# Patient Record
Sex: MALE | Race: WHITE | Employment: UNEMPLOYED | URBAN - METROPOLITAN AREA
[De-identification: names, ages, dates, MRNs, and addresses within clinical notes are randomized per-mention and may not be internally consistent; named-entity substitution may affect disease eponyms.]

---

## 2019-03-17 ENCOUNTER — HOSPITAL ENCOUNTER (OUTPATIENT)
Age: 84
Setting detail: OBSERVATION
LOS: 1 days | Discharge: HOME | End: 2019-03-19
Attending: EMERGENCY MEDICINE | Admitting: HOSPITALIST
Payer: MEDICARE

## 2019-03-17 ENCOUNTER — APPOINTMENT (OUTPATIENT)
Dept: CT IMAGING | Age: 84
End: 2019-03-17
Payer: MEDICARE

## 2019-03-17 DIAGNOSIS — R47.01 EXPRESSIVE APHASIA: Primary | ICD-10-CM

## 2019-03-17 DIAGNOSIS — I63.9 CEREBROVASCULAR ACCIDENT (CVA), UNSPECIFIED MECHANISM (HCC): ICD-10-CM

## 2019-03-17 DIAGNOSIS — R29.90 STROKE-LIKE SYMPTOMS: ICD-10-CM

## 2019-03-17 LAB
ALBUMIN SERPL-MCNC: 4.3 G/DL (ref 3.5–5.2)
ALP BLD-CCNC: 51 U/L (ref 40–129)
ALT SERPL-CCNC: 37 U/L (ref 0–40)
ANION GAP SERPL CALCULATED.3IONS-SCNC: 11 MMOL/L (ref 7–16)
AST SERPL-CCNC: 46 U/L (ref 0–39)
BILIRUB SERPL-MCNC: 0.3 MG/DL (ref 0–1.2)
BUN BLDV-MCNC: 31 MG/DL (ref 8–23)
CALCIUM SERPL-MCNC: 9 MG/DL (ref 8.6–10.2)
CHLORIDE BLD-SCNC: 101 MMOL/L (ref 98–107)
CO2: 29 MMOL/L (ref 22–29)
CREAT SERPL-MCNC: 1 MG/DL (ref 0.7–1.2)
GFR AFRICAN AMERICAN: >60
GFR NON-AFRICAN AMERICAN: >60 ML/MIN/1.73
GLUCOSE BLD-MCNC: 135 MG/DL (ref 74–99)
HCT VFR BLD CALC: 41.5 % (ref 37–54)
HEMOGLOBIN: 13.5 G/DL (ref 12.5–16.5)
MCH RBC QN AUTO: 31.2 PG (ref 26–35)
MCHC RBC AUTO-ENTMCNC: 32.5 % (ref 32–34.5)
MCV RBC AUTO: 95.8 FL (ref 80–99.9)
PDW BLD-RTO: 12.9 FL (ref 11.5–15)
PLATELET # BLD: 233 E9/L (ref 130–450)
PMV BLD AUTO: 10.4 FL (ref 7–12)
POTASSIUM SERPL-SCNC: 4.6 MMOL/L (ref 3.5–5)
RBC # BLD: 4.33 E12/L (ref 3.8–5.8)
SODIUM BLD-SCNC: 141 MMOL/L (ref 132–146)
TOTAL PROTEIN: 6.9 G/DL (ref 6.4–8.3)
TROPONIN: <0.01 NG/ML (ref 0–0.03)
WBC # BLD: 7.8 E9/L (ref 4.5–11.5)

## 2019-03-17 PROCEDURE — 85027 COMPLETE CBC AUTOMATED: CPT

## 2019-03-17 PROCEDURE — 84484 ASSAY OF TROPONIN QUANT: CPT

## 2019-03-17 PROCEDURE — 70450 CT HEAD/BRAIN W/O DYE: CPT

## 2019-03-17 PROCEDURE — 93005 ELECTROCARDIOGRAM TRACING: CPT | Performed by: EMERGENCY MEDICINE

## 2019-03-17 PROCEDURE — 36415 COLL VENOUS BLD VENIPUNCTURE: CPT

## 2019-03-17 PROCEDURE — 80053 COMPREHEN METABOLIC PANEL: CPT

## 2019-03-17 PROCEDURE — 99285 EMERGENCY DEPT VISIT HI MDM: CPT

## 2019-03-18 ENCOUNTER — APPOINTMENT (OUTPATIENT)
Dept: MRI IMAGING | Age: 84
End: 2019-03-18
Payer: MEDICARE

## 2019-03-18 PROBLEM — R29.90 STROKE-LIKE SYMPTOMS: Status: ACTIVE | Noted: 2019-03-18

## 2019-03-18 LAB
BILIRUBIN URINE: NEGATIVE
BLOOD, URINE: NEGATIVE
CHOLESTEROL, FASTING: 159 MG/DL (ref 0–199)
CLARITY: CLEAR
COLOR: YELLOW
EKG ATRIAL RATE: 202 BPM
EKG Q-T INTERVAL: 348 MS
EKG QRS DURATION: 104 MS
EKG QTC CALCULATION (BAZETT): 421 MS
EKG R AXIS: -12 DEGREES
EKG T AXIS: -8 DEGREES
EKG VENTRICULAR RATE: 88 BPM
GLUCOSE URINE: NEGATIVE MG/DL
HBA1C MFR BLD: 6 % (ref 4–5.6)
HDLC SERPL-MCNC: 58 MG/DL
KETONES, URINE: NEGATIVE MG/DL
LDL CHOLESTEROL CALCULATED: 92 MG/DL (ref 0–99)
LEUKOCYTE ESTERASE, URINE: NEGATIVE
NITRITE, URINE: NEGATIVE
PH UA: 5.5 (ref 5–9)
PROTEIN UA: NEGATIVE MG/DL
SPECIFIC GRAVITY UA: 1.02 (ref 1–1.03)
TRIGLYCERIDE, FASTING: 47 MG/DL (ref 0–149)
UROBILINOGEN, URINE: 0.2 E.U./DL
VLDLC SERPL CALC-MCNC: 9 MG/DL

## 2019-03-18 PROCEDURE — 70544 MR ANGIOGRAPHY HEAD W/O DYE: CPT

## 2019-03-18 PROCEDURE — 36415 COLL VENOUS BLD VENIPUNCTURE: CPT

## 2019-03-18 PROCEDURE — 83036 HEMOGLOBIN GLYCOSYLATED A1C: CPT

## 2019-03-18 PROCEDURE — 97530 THERAPEUTIC ACTIVITIES: CPT

## 2019-03-18 PROCEDURE — G0378 HOSPITAL OBSERVATION PER HR: HCPCS

## 2019-03-18 PROCEDURE — 70551 MRI BRAIN STEM W/O DYE: CPT

## 2019-03-18 PROCEDURE — 97162 PT EVAL MOD COMPLEX 30 MIN: CPT

## 2019-03-18 PROCEDURE — 6370000000 HC RX 637 (ALT 250 FOR IP): Performed by: HOSPITALIST

## 2019-03-18 PROCEDURE — 99218 PR INITIAL OBSERVATION CARE/DAY 30 MINUTES: CPT | Performed by: PSYCHIATRY & NEUROLOGY

## 2019-03-18 PROCEDURE — 81003 URINALYSIS AUTO W/O SCOPE: CPT

## 2019-03-18 PROCEDURE — 2580000003 HC RX 258: Performed by: HOSPITALIST

## 2019-03-18 PROCEDURE — 97535 SELF CARE MNGMENT TRAINING: CPT

## 2019-03-18 PROCEDURE — 70547 MR ANGIOGRAPHY NECK W/O DYE: CPT

## 2019-03-18 PROCEDURE — 80061 LIPID PANEL: CPT

## 2019-03-18 PROCEDURE — 97166 OT EVAL MOD COMPLEX 45 MIN: CPT

## 2019-03-18 PROCEDURE — 6370000000 HC RX 637 (ALT 250 FOR IP): Performed by: NURSE PRACTITIONER

## 2019-03-18 RX ORDER — ATORVASTATIN CALCIUM 10 MG/1
10 TABLET, FILM COATED ORAL DAILY
Status: DISCONTINUED | OUTPATIENT
Start: 2019-03-18 | End: 2019-03-19 | Stop reason: HOSPADM

## 2019-03-18 RX ORDER — ONDANSETRON 2 MG/ML
4 INJECTION INTRAMUSCULAR; INTRAVENOUS EVERY 6 HOURS PRN
Status: DISCONTINUED | OUTPATIENT
Start: 2019-03-18 | End: 2019-03-19 | Stop reason: HOSPADM

## 2019-03-18 RX ORDER — ESCITALOPRAM OXALATE 20 MG/1
20 TABLET ORAL DAILY
COMMUNITY

## 2019-03-18 RX ORDER — TAMSULOSIN HYDROCHLORIDE 0.4 MG/1
0.4 CAPSULE ORAL DAILY
Status: DISCONTINUED | OUTPATIENT
Start: 2019-03-18 | End: 2019-03-19 | Stop reason: HOSPADM

## 2019-03-18 RX ORDER — SODIUM CHLORIDE 0.9 % (FLUSH) 0.9 %
10 SYRINGE (ML) INJECTION PRN
Status: DISCONTINUED | OUTPATIENT
Start: 2019-03-18 | End: 2019-03-19 | Stop reason: HOSPADM

## 2019-03-18 RX ORDER — METOPROLOL SUCCINATE 50 MG/1
50 TABLET, EXTENDED RELEASE ORAL DAILY
Status: ON HOLD | COMMUNITY
End: 2019-03-19 | Stop reason: HOSPADM

## 2019-03-18 RX ORDER — ESCITALOPRAM OXALATE 10 MG/1
10 TABLET ORAL DAILY
Status: DISCONTINUED | OUTPATIENT
Start: 2019-03-18 | End: 2019-03-19 | Stop reason: HOSPADM

## 2019-03-18 RX ORDER — SODIUM CHLORIDE 0.9 % (FLUSH) 0.9 %
10 SYRINGE (ML) INJECTION EVERY 12 HOURS SCHEDULED
Status: DISCONTINUED | OUTPATIENT
Start: 2019-03-18 | End: 2019-03-18 | Stop reason: SDUPTHER

## 2019-03-18 RX ORDER — ACETAMINOPHEN 325 MG/1
650 TABLET ORAL EVERY 4 HOURS PRN
Status: DISCONTINUED | OUTPATIENT
Start: 2019-03-18 | End: 2019-03-19 | Stop reason: HOSPADM

## 2019-03-18 RX ORDER — SODIUM CHLORIDE 0.9 % (FLUSH) 0.9 %
10 SYRINGE (ML) INJECTION EVERY 12 HOURS SCHEDULED
Status: DISCONTINUED | OUTPATIENT
Start: 2019-03-18 | End: 2019-03-19 | Stop reason: HOSPADM

## 2019-03-18 RX ORDER — SODIUM CHLORIDE 0.9 % (FLUSH) 0.9 %
10 SYRINGE (ML) INJECTION PRN
Status: DISCONTINUED | OUTPATIENT
Start: 2019-03-18 | End: 2019-03-18 | Stop reason: SDUPTHER

## 2019-03-18 RX ORDER — MIRTAZAPINE 15 MG/1
15 TABLET, FILM COATED ORAL NIGHTLY
Status: DISCONTINUED | OUTPATIENT
Start: 2019-03-18 | End: 2019-03-19 | Stop reason: HOSPADM

## 2019-03-18 RX ORDER — BUSPIRONE HYDROCHLORIDE 5 MG/1
5 TABLET ORAL 2 TIMES DAILY
Status: DISCONTINUED | OUTPATIENT
Start: 2019-03-18 | End: 2019-03-19 | Stop reason: HOSPADM

## 2019-03-18 RX ADMIN — TAMSULOSIN HYDROCHLORIDE 0.4 MG: 0.4 CAPSULE ORAL at 10:04

## 2019-03-18 RX ADMIN — METOPROLOL TARTRATE 25 MG: 25 TABLET ORAL at 10:03

## 2019-03-18 RX ADMIN — ESCITALOPRAM OXALATE 10 MG: 10 TABLET, FILM COATED ORAL at 10:03

## 2019-03-18 RX ADMIN — Medication 10 ML: at 21:04

## 2019-03-18 RX ADMIN — Medication 10 ML: at 10:04

## 2019-03-18 RX ADMIN — RIVAROXABAN 20 MG: 20 TABLET, FILM COATED ORAL at 10:03

## 2019-03-18 ASSESSMENT — ENCOUNTER SYMPTOMS
EYE REDNESS: 0
SHORTNESS OF BREATH: 0
ABDOMINAL PAIN: 0
SORE THROAT: 0
DIARRHEA: 0
SINUS PRESSURE: 0
WHEEZING: 0
EYE PAIN: 0
BACK PAIN: 0
NAUSEA: 0
VOMITING: 0
COUGH: 0
EYE DISCHARGE: 0

## 2019-03-18 ASSESSMENT — PAIN SCALES - GENERAL
PAINLEVEL_OUTOF10: 0

## 2019-03-19 VITALS
SYSTOLIC BLOOD PRESSURE: 105 MMHG | BODY MASS INDEX: 24.99 KG/M2 | OXYGEN SATURATION: 95 % | WEIGHT: 150 LBS | HEIGHT: 65 IN | HEART RATE: 72 BPM | TEMPERATURE: 98.8 F | RESPIRATION RATE: 18 BRPM | DIASTOLIC BLOOD PRESSURE: 59 MMHG

## 2019-03-19 LAB
ANION GAP SERPL CALCULATED.3IONS-SCNC: 12 MMOL/L (ref 7–16)
BUN BLDV-MCNC: 21 MG/DL (ref 8–23)
CALCIUM SERPL-MCNC: 8.4 MG/DL (ref 8.6–10.2)
CHLORIDE BLD-SCNC: 100 MMOL/L (ref 98–107)
CO2: 27 MMOL/L (ref 22–29)
CREAT SERPL-MCNC: 0.9 MG/DL (ref 0.7–1.2)
EKG ATRIAL RATE: 468 BPM
EKG Q-T INTERVAL: 348 MS
EKG QRS DURATION: 102 MS
EKG QTC CALCULATION (BAZETT): 428 MS
EKG R AXIS: -16 DEGREES
EKG T AXIS: -11 DEGREES
EKG VENTRICULAR RATE: 91 BPM
GFR AFRICAN AMERICAN: >60
GFR NON-AFRICAN AMERICAN: >60 ML/MIN/1.73
GLUCOSE BLD-MCNC: 136 MG/DL (ref 74–99)
HCT VFR BLD CALC: 39.3 % (ref 37–54)
HEMOGLOBIN: 13.1 G/DL (ref 12.5–16.5)
LV EF: 55 %
LVEF MODALITY: NORMAL
MAGNESIUM: 2.4 MG/DL (ref 1.6–2.6)
MCH RBC QN AUTO: 31 PG (ref 26–35)
MCHC RBC AUTO-ENTMCNC: 33.3 % (ref 32–34.5)
MCV RBC AUTO: 93.1 FL (ref 80–99.9)
PDW BLD-RTO: 12.8 FL (ref 11.5–15)
PLATELET # BLD: 218 E9/L (ref 130–450)
PMV BLD AUTO: 10.1 FL (ref 7–12)
POTASSIUM REFLEX MAGNESIUM: 4.2 MMOL/L (ref 3.5–5)
RBC # BLD: 4.22 E12/L (ref 3.8–5.8)
SODIUM BLD-SCNC: 139 MMOL/L (ref 132–146)
WBC # BLD: 7.8 E9/L (ref 4.5–11.5)

## 2019-03-19 PROCEDURE — 93306 TTE W/DOPPLER COMPLETE: CPT

## 2019-03-19 PROCEDURE — 92523 SPEECH SOUND LANG COMPREHEN: CPT

## 2019-03-19 PROCEDURE — 36415 COLL VENOUS BLD VENIPUNCTURE: CPT

## 2019-03-19 PROCEDURE — G0378 HOSPITAL OBSERVATION PER HR: HCPCS

## 2019-03-19 PROCEDURE — 2580000003 HC RX 258: Performed by: HOSPITALIST

## 2019-03-19 PROCEDURE — 6370000000 HC RX 637 (ALT 250 FOR IP): Performed by: HOSPITALIST

## 2019-03-19 PROCEDURE — 83735 ASSAY OF MAGNESIUM: CPT

## 2019-03-19 PROCEDURE — 85027 COMPLETE CBC AUTOMATED: CPT

## 2019-03-19 PROCEDURE — 80048 BASIC METABOLIC PNL TOTAL CA: CPT

## 2019-03-19 RX ADMIN — TAMSULOSIN HYDROCHLORIDE 0.4 MG: 0.4 CAPSULE ORAL at 08:58

## 2019-03-19 RX ADMIN — ESCITALOPRAM OXALATE 10 MG: 10 TABLET, FILM COATED ORAL at 08:57

## 2019-03-19 RX ADMIN — Medication 10 ML: at 08:58

## 2019-03-19 RX ADMIN — METOPROLOL TARTRATE 25 MG: 25 TABLET ORAL at 08:58

## 2019-03-19 ASSESSMENT — PAIN SCALES - GENERAL
PAINLEVEL_OUTOF10: 0
PAINLEVEL_OUTOF10: 0

## 2024-06-19 ENCOUNTER — APPOINTMENT (OUTPATIENT)
Dept: GENERAL RADIOLOGY | Age: 89
DRG: 522 | End: 2024-06-19
Payer: MEDICARE

## 2024-06-19 ENCOUNTER — APPOINTMENT (OUTPATIENT)
Dept: CT IMAGING | Age: 89
DRG: 522 | End: 2024-06-19
Payer: MEDICARE

## 2024-06-19 ENCOUNTER — HOSPITAL ENCOUNTER (INPATIENT)
Age: 89
LOS: 8 days | Discharge: NURSING, REHAB OR LONG-TERM CARE | DRG: 522 | End: 2024-06-27
Attending: EMERGENCY MEDICINE | Admitting: INTERNAL MEDICINE
Payer: MEDICARE

## 2024-06-19 DIAGNOSIS — S72.002A HIP FRACTURE, LEFT, CLOSED, INITIAL ENCOUNTER (HCC): Primary | ICD-10-CM

## 2024-06-19 DIAGNOSIS — S72.002A CLOSED LEFT HIP FRACTURE, INITIAL ENCOUNTER (HCC): ICD-10-CM

## 2024-06-19 DIAGNOSIS — S72.002A CLOSED FRACTURE OF LEFT HIP, INITIAL ENCOUNTER (HCC): ICD-10-CM

## 2024-06-19 DIAGNOSIS — Z87.81 S/P LEFT HIP FRACTURE: ICD-10-CM

## 2024-06-19 LAB
ANION GAP SERPL CALCULATED.3IONS-SCNC: 18 MMOL/L (ref 7–16)
BASOPHILS # BLD: 0.03 K/UL (ref 0–0.2)
BASOPHILS NFR BLD: 0 % (ref 0–2)
BUN SERPL-MCNC: 25 MG/DL (ref 6–23)
CALCIUM SERPL-MCNC: 9.3 MG/DL (ref 8.6–10.2)
CHLORIDE SERPL-SCNC: 102 MMOL/L (ref 98–107)
CO2 SERPL-SCNC: 22 MMOL/L (ref 22–29)
CREAT SERPL-MCNC: 1.3 MG/DL (ref 0.7–1.2)
EOSINOPHIL # BLD: 0 K/UL (ref 0.05–0.5)
EOSINOPHILS RELATIVE PERCENT: 0 % (ref 0–6)
ERYTHROCYTE [DISTWIDTH] IN BLOOD BY AUTOMATED COUNT: 13.2 % (ref 11.5–15)
GFR, ESTIMATED: 54 ML/MIN/1.73M2
GLUCOSE SERPL-MCNC: 100 MG/DL (ref 74–99)
HCT VFR BLD AUTO: 42.4 % (ref 37–54)
HGB BLD-MCNC: 14 G/DL (ref 12.5–16.5)
IMM GRANULOCYTES # BLD AUTO: 0.11 K/UL (ref 0–0.58)
IMM GRANULOCYTES NFR BLD: 1 % (ref 0–5)
INR PPP: 1.4
LYMPHOCYTES NFR BLD: 0.85 K/UL (ref 1.5–4)
LYMPHOCYTES RELATIVE PERCENT: 6 % (ref 20–42)
MCH RBC QN AUTO: 31.3 PG (ref 26–35)
MCHC RBC AUTO-ENTMCNC: 33 G/DL (ref 32–34.5)
MCV RBC AUTO: 94.9 FL (ref 80–99.9)
MONOCYTES NFR BLD: 0.73 K/UL (ref 0.1–0.95)
MONOCYTES NFR BLD: 5 % (ref 2–12)
NEUTROPHILS NFR BLD: 89 % (ref 43–80)
NEUTS SEG NFR BLD: 13.64 K/UL (ref 1.8–7.3)
PLATELET # BLD AUTO: 200 K/UL (ref 130–450)
PMV BLD AUTO: 9.8 FL (ref 7–12)
POTASSIUM SERPL-SCNC: 4.2 MMOL/L (ref 3.5–5)
PROTHROMBIN TIME: 15.2 SEC (ref 9.3–12.4)
RBC # BLD AUTO: 4.47 M/UL (ref 3.8–5.8)
SODIUM SERPL-SCNC: 142 MMOL/L (ref 132–146)
WBC OTHER # BLD: 15.4 K/UL (ref 4.5–11.5)

## 2024-06-19 PROCEDURE — 6360000002 HC RX W HCPCS: Performed by: EMERGENCY MEDICINE

## 2024-06-19 PROCEDURE — 70450 CT HEAD/BRAIN W/O DYE: CPT

## 2024-06-19 PROCEDURE — 85610 PROTHROMBIN TIME: CPT

## 2024-06-19 PROCEDURE — 73502 X-RAY EXAM HIP UNI 2-3 VIEWS: CPT

## 2024-06-19 PROCEDURE — 99285 EMERGENCY DEPT VISIT HI MDM: CPT

## 2024-06-19 PROCEDURE — 72131 CT LUMBAR SPINE W/O DYE: CPT

## 2024-06-19 PROCEDURE — 80048 BASIC METABOLIC PNL TOTAL CA: CPT

## 2024-06-19 PROCEDURE — 85025 COMPLETE CBC W/AUTO DIFF WBC: CPT

## 2024-06-19 PROCEDURE — 2060000000 HC ICU INTERMEDIATE R&B

## 2024-06-19 PROCEDURE — 2580000003 HC RX 258: Performed by: INTERNAL MEDICINE

## 2024-06-19 PROCEDURE — 72128 CT CHEST SPINE W/O DYE: CPT

## 2024-06-19 PROCEDURE — 72125 CT NECK SPINE W/O DYE: CPT

## 2024-06-19 PROCEDURE — 93005 ELECTROCARDIOGRAM TRACING: CPT | Performed by: EMERGENCY MEDICINE

## 2024-06-19 RX ORDER — ONDANSETRON 4 MG/1
4 TABLET, ORALLY DISINTEGRATING ORAL EVERY 8 HOURS PRN
Status: DISCONTINUED | OUTPATIENT
Start: 2024-06-19 | End: 2024-06-27 | Stop reason: HOSPADM

## 2024-06-19 RX ORDER — TAMSULOSIN HYDROCHLORIDE 0.4 MG/1
0.4 CAPSULE ORAL DAILY
Status: DISCONTINUED | OUTPATIENT
Start: 2024-06-20 | End: 2024-06-27 | Stop reason: HOSPADM

## 2024-06-19 RX ORDER — SODIUM CHLORIDE 0.9 % (FLUSH) 0.9 %
5-40 SYRINGE (ML) INJECTION PRN
Status: DISCONTINUED | OUTPATIENT
Start: 2024-06-19 | End: 2024-06-27 | Stop reason: HOSPADM

## 2024-06-19 RX ORDER — SODIUM CHLORIDE 9 MG/ML
INJECTION, SOLUTION INTRAVENOUS PRN
Status: DISCONTINUED | OUTPATIENT
Start: 2024-06-19 | End: 2024-06-27 | Stop reason: HOSPADM

## 2024-06-19 RX ORDER — OXYCODONE HYDROCHLORIDE 5 MG/1
5 TABLET ORAL EVERY 4 HOURS PRN
Status: DISCONTINUED | OUTPATIENT
Start: 2024-06-19 | End: 2024-06-27 | Stop reason: HOSPADM

## 2024-06-19 RX ORDER — ACETAMINOPHEN 325 MG/1
650 TABLET ORAL EVERY 6 HOURS PRN
Status: DISCONTINUED | OUTPATIENT
Start: 2024-06-19 | End: 2024-06-27 | Stop reason: HOSPADM

## 2024-06-19 RX ORDER — VITAMIN B COMPLEX
2000 TABLET ORAL DAILY
Status: DISCONTINUED | OUTPATIENT
Start: 2024-06-20 | End: 2024-06-27 | Stop reason: HOSPADM

## 2024-06-19 RX ORDER — FENTANYL CITRATE 0.05 MG/ML
50 INJECTION, SOLUTION INTRAMUSCULAR; INTRAVENOUS ONCE
Status: COMPLETED | OUTPATIENT
Start: 2024-06-19 | End: 2024-06-19

## 2024-06-19 RX ORDER — SODIUM CHLORIDE 0.9 % (FLUSH) 0.9 %
5-40 SYRINGE (ML) INJECTION EVERY 12 HOURS SCHEDULED
Status: DISCONTINUED | OUTPATIENT
Start: 2024-06-19 | End: 2024-06-27 | Stop reason: HOSPADM

## 2024-06-19 RX ORDER — MORPHINE SULFATE 2 MG/ML
2 INJECTION, SOLUTION INTRAMUSCULAR; INTRAVENOUS EVERY 4 HOURS PRN
Status: DISCONTINUED | OUTPATIENT
Start: 2024-06-19 | End: 2024-06-27 | Stop reason: HOSPADM

## 2024-06-19 RX ORDER — POLYETHYLENE GLYCOL 3350 17 G/17G
17 POWDER, FOR SOLUTION ORAL DAILY PRN
Status: DISCONTINUED | OUTPATIENT
Start: 2024-06-19 | End: 2024-06-27 | Stop reason: HOSPADM

## 2024-06-19 RX ORDER — POTASSIUM CHLORIDE 20 MEQ/1
40 TABLET, EXTENDED RELEASE ORAL PRN
Status: DISCONTINUED | OUTPATIENT
Start: 2024-06-19 | End: 2024-06-27 | Stop reason: HOSPADM

## 2024-06-19 RX ORDER — POTASSIUM CHLORIDE 7.45 MG/ML
10 INJECTION INTRAVENOUS PRN
Status: DISCONTINUED | OUTPATIENT
Start: 2024-06-19 | End: 2024-06-27 | Stop reason: HOSPADM

## 2024-06-19 RX ORDER — ACETAMINOPHEN 650 MG/1
650 SUPPOSITORY RECTAL EVERY 6 HOURS PRN
Status: DISCONTINUED | OUTPATIENT
Start: 2024-06-19 | End: 2024-06-27 | Stop reason: HOSPADM

## 2024-06-19 RX ORDER — MAGNESIUM SULFATE IN WATER 40 MG/ML
2000 INJECTION, SOLUTION INTRAVENOUS PRN
Status: DISCONTINUED | OUTPATIENT
Start: 2024-06-19 | End: 2024-06-27 | Stop reason: HOSPADM

## 2024-06-19 RX ORDER — ONDANSETRON 2 MG/ML
4 INJECTION INTRAMUSCULAR; INTRAVENOUS EVERY 6 HOURS PRN
Status: DISCONTINUED | OUTPATIENT
Start: 2024-06-19 | End: 2024-06-27 | Stop reason: HOSPADM

## 2024-06-19 RX ORDER — ESCITALOPRAM OXALATE 10 MG/1
20 TABLET ORAL DAILY
Status: DISCONTINUED | OUTPATIENT
Start: 2024-06-20 | End: 2024-06-27 | Stop reason: HOSPADM

## 2024-06-19 RX ADMIN — FENTANYL CITRATE 50 MCG: 0.05 INJECTION, SOLUTION INTRAMUSCULAR; INTRAVENOUS at 18:40

## 2024-06-19 RX ADMIN — Medication 10 ML: at 21:30

## 2024-06-19 ASSESSMENT — PAIN DESCRIPTION - LOCATION: LOCATION: HIP

## 2024-06-19 ASSESSMENT — PAIN DESCRIPTION - PAIN TYPE: TYPE: ACUTE PAIN

## 2024-06-19 ASSESSMENT — PAIN DESCRIPTION - ORIENTATION
ORIENTATION: LEFT
ORIENTATION: LEFT

## 2024-06-19 ASSESSMENT — LIFESTYLE VARIABLES
HOW MANY STANDARD DRINKS CONTAINING ALCOHOL DO YOU HAVE ON A TYPICAL DAY: PATIENT DOES NOT DRINK
HOW OFTEN DO YOU HAVE A DRINK CONTAINING ALCOHOL: NEVER

## 2024-06-19 ASSESSMENT — PAIN DESCRIPTION - DESCRIPTORS
DESCRIPTORS: ACHING;SORE
DESCRIPTORS: ACHING

## 2024-06-19 ASSESSMENT — PAIN - FUNCTIONAL ASSESSMENT: PAIN_FUNCTIONAL_ASSESSMENT: 0-10

## 2024-06-19 ASSESSMENT — PAIN SCALES - GENERAL
PAINLEVEL_OUTOF10: 9
PAINLEVEL_OUTOF10: 9

## 2024-06-19 ASSESSMENT — PAIN DESCRIPTION - FREQUENCY: FREQUENCY: INTERMITTENT

## 2024-06-19 NOTE — ED PROVIDER NOTES
[facility] EMERGENCY DEPARTMENT  EMERGENCY DEPARTMENT ENCOUNTER        Pt Name: Lonnie Pena  MRN: [id number]  Birthdate 1935  Date of evaluation: 6/19/2024  Provider: Saurabh Gonzales DO  PCP: Alok Minaya MD  Note Started: 4:13 PM EDT 6/19/24    CHIEF COMPLAINT       Chief Complaint   Patient presents with    Back Pain    Fall     From [facility]. Fall out of bed. C/O L hip pain.        HISTORY OF PRESENT ILLNESS: 1 or more Elements   History From: patient    Limitations to history : None    Lonnie Pena is a 88 y.o. male who presents to the ED for evaluation of fall.  Patient states that he thinks he may have passed out and then fell to the ground.  He is unsure whether or not he passed out.  He denied having any preceding chest pain or shortness of breath.  When he was on the ground he noticed that he was having left hip pain and was unable to get up.  Patient is on Xarelto.  Patient states that once he was on the ground he was not able to get up and required assistance.  Patient complaining of pain in the left hip.  Denies any neck pain.  But is having back pain as well.    Nursing Notes were all reviewed and agreed with or any disagreements were addressed in the HPI.      REVIEW OF EXTERNAL NOTE :    Chart view shows the patient had echo performed on 3/17/2019. Patient has EF of 55%    REVIEW OF SYSTEMS :           Positives and Pertinent negatives as per HPI.     SURGICAL HISTORY     Past Surgical History:   Procedure Laterality Date    CORONARY ARTERY BYPASS GRAFT  2011    HEMORRHOID SURGERY      PERICARDIUM SURGERY         CURRENTMEDICATIONS       Previous Medications    CHOLECALCIFEROL (VITAMIN D3 PO)    Take 125 mcg by mouth daily    ESCITALOPRAM (LEXAPRO) 20 MG TABLET    Take 20 mg by mouth daily    METFORMIN (GLUCOPHAGE) 500 MG TABLET    Take 500 mg by mouth daily (with breakfast)    METOPROLOL TARTRATE (LOPRESSOR) 25 MG TABLET    Take 1 tablet by mouth  injection 50 mcg (has no administration in time range)           Is this patient to be included in the SEP-1 Core Measure due to severe sepsis or septic shock?   No Exclusion criteria - the patient is NOT to be included for SEP-1 Core Measure due to: Infection is not suspected        Medical Decision Making/Differential Diagnosis:    CC/HPI Summary, Social Determinants of health, Records Reviewed, DDx, testing done/not done, ED Course, Reassessment, disposition considerations/shared decision making with patient, consults, disposition:      ED Course as of 06/19/24 1751 Wed Jun 19, 2024   1743 Patient rates his pain a 9 out of 10.  Discussed results of labs and imaging.  No orthopedic preference.  Will admit to Dr. Minaya.  Fentanyl ordered and will reassess. [MS]   1751 Spoke with Dr. Minaya (Medicine).  Discussed case.  He will admit this patient   [MS]      ED Course User Index  [MS] Janet Saurabh, DO        Medical Decision Making  Amount and/or Complexity of Data Reviewed  Labs: ordered.  Radiology: ordered.  ECG/medicine tests: ordered.    Risk  Prescription drug management.  Decision regarding hospitalization.        Patient presents to the ED for evaluation of fall.  Patient states that he thinks he may have passed out and then fell to the ground.  He is unsure whether or not he passed out.  He denied having any preceding chest pain or shortness of breath.  When he was on the ground he noticed that he was having left hip pain and was unable to get up.  Patient is on Xarelto.  Patient states that once he was on the ground he was not able to get up and required assistance.  Patient throbbing pain in the left hip. Differential diagnoses included but not limited to intracranial hemorrhage, orthostatic syncope, anginal equivalent, dehydration, acute renal injury, electrolyte abnormalities, hip fracture/subluxation/dislocation. Workup in the ED consisted appropriate labs and imaging.  The following labs were

## 2024-06-19 NOTE — H&P
Department of Internal Medicine  History and Physical    PCP: Dr. Alok Minaya  Admitting Physician: Dr. Alok Minaya  Consultants:        CHIEF COMPLAINT: Left hip pain    HISTORY OF PRESENT ILLNESS:    This is a very pleasant 88 years old white male with past medical history of atrial fibrillation coronary artery disease history of congestive heart failure chronic kidney disease type 2 diabetes DVT hyperlipidemia hypertension and thrombocytopenia and bullous pemphigus who was at the nursing home he also has advanced dementia.  According to the nursing staff at the nursing home patient fell out of the bed and is complaining of left hip pain I ordered the patient to be sent to the emergency room for further evaluation where we noted that the patient has left hip fracture he is admitted for treatment    PAST MEDICAL Hx:  Past Medical History:   Diagnosis Date    A-fib (HCC)     Atrial fibrillation (HCC)     CAD (coronary artery disease)     CHF (congestive heart failure) (HCC)     Chronic kidney disease     Diabetes mellitus (HCC)     DVT (deep venous thrombosis) (HCC)     Hyperlipidemia     Hypertension     Inguinal hernia, bilateral     Thrombocytopenia (HCC)        PAST SURGICAL Hx:   Past Surgical History:   Procedure Laterality Date    CORONARY ARTERY BYPASS GRAFT  2011    HEMORRHOID SURGERY      PERICARDIUM SURGERY         FAMILY Hx:  No family history on file.    HOME MEDICATIONS:  Prior to Admission medications    Medication Sig Start Date End Date Taking? Authorizing Provider   metoprolol tartrate (LOPRESSOR) 25 MG tablet Take 1 tablet by mouth daily 3/19/19   Rinku Gaxiola MD   Potassium 99 MG TABS Take 1 tablet by mouth daily 3/19/19 4/18/19  Rinku Gaxiola MD   escitalopram (LEXAPRO) 20 MG tablet Take 20 mg by mouth daily    Thee Gallegos MD   metFORMIN (GLUCOPHAGE) 500 MG tablet Take 500 mg by mouth daily (with breakfast)    Thee Gallegos MD   Cholecalciferol (VITAMIN D3 PO) Take 125  difficulty.    Musculoskeletal:   Positive for left hip pain    Neurology:    Denies any headache or focal neurological deficits. No weakness or paresthesia.    Derm:    Denies any rashes, ulcers, or excoriations.  Denies bruising.      Extremities:   Denies any lower extremity swelling or edema.      PHYSICAL EXAM:  VITALS:  Vitals:    06/19/24 1621   BP:    Pulse:    Resp:    Temp: 97.9 °F (36.6 °C)   SpO2:      Patient is awake alert oriented hemodynamically stable  HEENT unremarkable  Neck negative JVD negative node negative thyromegaly  Lungs clear to auscultation and percussion  Cardiac examination S1-S2 normal irregularly irregular  Abdomen soft plus bowel sounds negative hepatosplenomegaly  Extremity pain on movement of the left.  Externally rotated  Neuro nonfocal cranial nerves II through XII intact  Psych unremarkable      LABORATORY DATA:  CBC with Differential:    Lab Results   Component Value Date/Time    WBC 15.4 06/19/2024 04:25 PM    RBC 4.47 06/19/2024 04:25 PM    HGB 14.0 06/19/2024 04:25 PM    HCT 42.4 06/19/2024 04:25 PM     06/19/2024 04:25 PM    MCV 94.9 06/19/2024 04:25 PM    MCH 31.3 06/19/2024 04:25 PM    MCHC 33.0 06/19/2024 04:25 PM    RDW 13.2 06/19/2024 04:25 PM    LYMPHOPCT 6 06/19/2024 04:25 PM    MONOPCT 5 06/19/2024 04:25 PM    EOSPCT 0 06/19/2024 04:25 PM    BASOPCT 0 06/19/2024 04:25 PM    MONOSABS 0.73 06/19/2024 04:25 PM    LYMPHSABS 1.60 02/20/2014 08:17 AM    EOSABS 0.00 06/19/2024 04:25 PM    BASOSABS 0.03 06/19/2024 04:25 PM     CMP:    Lab Results   Component Value Date/Time     06/19/2024 04:25 PM    K 4.2 06/19/2024 04:25 PM    K 4.2 03/19/2019 06:21 AM     06/19/2024 04:25 PM    CO2 22 06/19/2024 04:25 PM    BUN 25 06/19/2024 04:25 PM    CREATININE 1.3 06/19/2024 04:25 PM    GFRAA >60 03/19/2019 06:21 AM    LABGLOM 54 06/19/2024 04:25 PM    GLUCOSE 100 06/19/2024 04:25 PM    GLUCOSE 117 03/27/2012 09:14 AM    CALCIUM 9.3 06/19/2024 04:25 PM    BILITOT

## 2024-06-20 ENCOUNTER — APPOINTMENT (OUTPATIENT)
Dept: GENERAL RADIOLOGY | Age: 89
DRG: 522 | End: 2024-06-20
Payer: MEDICARE

## 2024-06-20 ENCOUNTER — ANESTHESIA (OUTPATIENT)
Dept: OPERATING ROOM | Age: 89
End: 2024-06-20
Payer: MEDICARE

## 2024-06-20 ENCOUNTER — ANESTHESIA EVENT (OUTPATIENT)
Dept: OPERATING ROOM | Age: 89
End: 2024-06-20
Payer: MEDICARE
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
## 2024-06-20 LAB
BASOPHILS # BLD: 0.02 K/UL (ref 0–0.2)
BASOPHILS NFR BLD: 0 % (ref 0–2)
EOSINOPHIL # BLD: 0.01 K/UL (ref 0.05–0.5)
EOSINOPHILS RELATIVE PERCENT: 0 % (ref 0–6)
ERYTHROCYTE [DISTWIDTH] IN BLOOD BY AUTOMATED COUNT: 13.3 % (ref 11.5–15)
GLUCOSE BLD-MCNC: 138 MG/DL (ref 74–99)
HCT VFR BLD AUTO: 38.7 % (ref 37–54)
HGB BLD-MCNC: 12.9 G/DL (ref 12.5–16.5)
IMM GRANULOCYTES # BLD AUTO: 0.04 K/UL (ref 0–0.58)
IMM GRANULOCYTES NFR BLD: 0 % (ref 0–5)
LYMPHOCYTES NFR BLD: 1.71 K/UL (ref 1.5–4)
LYMPHOCYTES RELATIVE PERCENT: 15 % (ref 20–42)
MCH RBC QN AUTO: 30.5 PG (ref 26–35)
MCHC RBC AUTO-ENTMCNC: 33.3 G/DL (ref 32–34.5)
MCV RBC AUTO: 91.5 FL (ref 80–99.9)
MONOCYTES NFR BLD: 0.93 K/UL (ref 0.1–0.95)
MONOCYTES NFR BLD: 8 % (ref 2–12)
NEUTROPHILS NFR BLD: 76 % (ref 43–80)
NEUTS SEG NFR BLD: 8.76 K/UL (ref 1.8–7.3)
PLATELET # BLD AUTO: 196 K/UL (ref 130–450)
PMV BLD AUTO: 9.9 FL (ref 7–12)
RBC # BLD AUTO: 4.23 M/UL (ref 3.8–5.8)
WBC OTHER # BLD: 11.5 K/UL (ref 4.5–11.5)

## 2024-06-20 PROCEDURE — 36415 COLL VENOUS BLD VENIPUNCTURE: CPT

## 2024-06-20 PROCEDURE — 6360000002 HC RX W HCPCS

## 2024-06-20 PROCEDURE — 3700000000 HC ANESTHESIA ATTENDED CARE: Performed by: ORTHOPAEDIC SURGERY

## 2024-06-20 PROCEDURE — 7100000001 HC PACU RECOVERY - ADDTL 15 MIN: Performed by: ORTHOPAEDIC SURGERY

## 2024-06-20 PROCEDURE — 2500000003 HC RX 250 WO HCPCS: Performed by: NURSE ANESTHETIST, CERTIFIED REGISTERED

## 2024-06-20 PROCEDURE — 2709999900 HC NON-CHARGEABLE SUPPLY: Performed by: ORTHOPAEDIC SURGERY

## 2024-06-20 PROCEDURE — 2060000000 HC ICU INTERMEDIATE R&B

## 2024-06-20 PROCEDURE — 27269 OPTX THIGH FX: CPT | Performed by: ORTHOPAEDIC SURGERY

## 2024-06-20 PROCEDURE — 6360000002 HC RX W HCPCS: Performed by: NURSE ANESTHETIST, CERTIFIED REGISTERED

## 2024-06-20 PROCEDURE — C1776 JOINT DEVICE (IMPLANTABLE): HCPCS | Performed by: ORTHOPAEDIC SURGERY

## 2024-06-20 PROCEDURE — 7100000000 HC PACU RECOVERY - FIRST 15 MIN: Performed by: ORTHOPAEDIC SURGERY

## 2024-06-20 PROCEDURE — 2500000003 HC RX 250 WO HCPCS

## 2024-06-20 PROCEDURE — 3600000005 HC SURGERY LEVEL 5 BASE: Performed by: ORTHOPAEDIC SURGERY

## 2024-06-20 PROCEDURE — 0SRS0JA REPLACEMENT OF LEFT HIP JOINT, FEMORAL SURFACE WITH SYNTHETIC SUBSTITUTE, UNCEMENTED, OPEN APPROACH: ICD-10-PCS | Performed by: ORTHOPAEDIC SURGERY

## 2024-06-20 PROCEDURE — 6360000002 HC RX W HCPCS: Performed by: ORTHOPAEDIC SURGERY

## 2024-06-20 PROCEDURE — 2580000003 HC RX 258

## 2024-06-20 PROCEDURE — 3700000001 HC ADD 15 MINUTES (ANESTHESIA): Performed by: ORTHOPAEDIC SURGERY

## 2024-06-20 PROCEDURE — 3600000015 HC SURGERY LEVEL 5 ADDTL 15MIN: Performed by: ORTHOPAEDIC SURGERY

## 2024-06-20 PROCEDURE — 99231 SBSQ HOSP IP/OBS SF/LOW 25: CPT | Performed by: ORTHOPAEDIC SURGERY

## 2024-06-20 PROCEDURE — 73502 X-RAY EXAM HIP UNI 2-3 VIEWS: CPT

## 2024-06-20 PROCEDURE — 82962 GLUCOSE BLOOD TEST: CPT

## 2024-06-20 PROCEDURE — 88311 DECALCIFY TISSUE: CPT

## 2024-06-20 PROCEDURE — 88305 TISSUE EXAM BY PATHOLOGIST: CPT

## 2024-06-20 PROCEDURE — 73552 X-RAY EXAM OF FEMUR 2/>: CPT

## 2024-06-20 PROCEDURE — 2580000003 HC RX 258: Performed by: NURSE ANESTHETIST, CERTIFIED REGISTERED

## 2024-06-20 PROCEDURE — 85025 COMPLETE CBC W/AUTO DIFF WBC: CPT

## 2024-06-20 DEVICE — SELF CENTERING BI-POLAR HEAD 28MM ID 57MM OD
Type: IMPLANTABLE DEVICE | Site: HIP | Status: FUNCTIONAL
Brand: SELF CENTERING

## 2024-06-20 DEVICE — HIP H4 HEMI UNI BIPLR IMPL CAPPED H4: Type: IMPLANTABLE DEVICE | Site: HIP | Status: FUNCTIONAL

## 2024-06-20 DEVICE — ARTICUL/EZE FEMORAL HEAD DIAMETER 28MM +8.5 12/14 TAPER
Type: IMPLANTABLE DEVICE | Site: HIP | Status: FUNCTIONAL
Brand: ARTICUL/EZE

## 2024-06-20 DEVICE — ACTIS DUOFIX HIP PROSTHESIS FEMORAL STEM 12/14 TAPER CEMENTLESS SIZE 11 HIGH COLLAR  CE
Type: IMPLANTABLE DEVICE | Site: HIP | Status: FUNCTIONAL
Brand: ACTIS

## 2024-06-20 RX ORDER — PROCHLORPERAZINE EDISYLATE 5 MG/ML
5 INJECTION INTRAMUSCULAR; INTRAVENOUS
Status: DISCONTINUED | OUTPATIENT
Start: 2024-06-20 | End: 2024-06-20 | Stop reason: HOSPADM

## 2024-06-20 RX ORDER — PROPOFOL 10 MG/ML
INJECTION, EMULSION INTRAVENOUS PRN
Status: DISCONTINUED | OUTPATIENT
Start: 2024-06-20 | End: 2024-06-20 | Stop reason: SDUPTHER

## 2024-06-20 RX ORDER — ROCURONIUM BROMIDE 10 MG/ML
INJECTION, SOLUTION INTRAVENOUS PRN
Status: DISCONTINUED | OUTPATIENT
Start: 2024-06-20 | End: 2024-06-20 | Stop reason: SDUPTHER

## 2024-06-20 RX ORDER — KETOROLAC TROMETHAMINE 15 MG/ML
15 INJECTION, SOLUTION INTRAMUSCULAR; INTRAVENOUS
Status: DISCONTINUED | OUTPATIENT
Start: 2024-06-20 | End: 2024-06-20 | Stop reason: HOSPADM

## 2024-06-20 RX ORDER — MORPHINE SULFATE 2 MG/ML
2 INJECTION, SOLUTION INTRAMUSCULAR; INTRAVENOUS EVERY 5 MIN PRN
Status: DISCONTINUED | OUTPATIENT
Start: 2024-06-20 | End: 2024-06-20 | Stop reason: HOSPADM

## 2024-06-20 RX ORDER — FENTANYL CITRATE 50 UG/ML
INJECTION, SOLUTION INTRAMUSCULAR; INTRAVENOUS PRN
Status: DISCONTINUED | OUTPATIENT
Start: 2024-06-20 | End: 2024-06-20 | Stop reason: SDUPTHER

## 2024-06-20 RX ORDER — NALOXONE HYDROCHLORIDE 0.4 MG/ML
INJECTION, SOLUTION INTRAMUSCULAR; INTRAVENOUS; SUBCUTANEOUS PRN
Status: DISCONTINUED | OUTPATIENT
Start: 2024-06-20 | End: 2024-06-20 | Stop reason: HOSPADM

## 2024-06-20 RX ORDER — ONDANSETRON 2 MG/ML
INJECTION INTRAMUSCULAR; INTRAVENOUS PRN
Status: DISCONTINUED | OUTPATIENT
Start: 2024-06-20 | End: 2024-06-20 | Stop reason: SDUPTHER

## 2024-06-20 RX ORDER — ONDANSETRON 2 MG/ML
4 INJECTION INTRAMUSCULAR; INTRAVENOUS
Status: DISCONTINUED | OUTPATIENT
Start: 2024-06-20 | End: 2024-06-20 | Stop reason: HOSPADM

## 2024-06-20 RX ORDER — LIDOCAINE HYDROCHLORIDE 20 MG/ML
INJECTION, SOLUTION INTRAVENOUS PRN
Status: DISCONTINUED | OUTPATIENT
Start: 2024-06-20 | End: 2024-06-20 | Stop reason: SDUPTHER

## 2024-06-20 RX ORDER — DIPHENHYDRAMINE HYDROCHLORIDE 50 MG/ML
12.5 INJECTION INTRAMUSCULAR; INTRAVENOUS
Status: DISCONTINUED | OUTPATIENT
Start: 2024-06-20 | End: 2024-06-20 | Stop reason: HOSPADM

## 2024-06-20 RX ORDER — IPRATROPIUM BROMIDE AND ALBUTEROL SULFATE 2.5; .5 MG/3ML; MG/3ML
1 SOLUTION RESPIRATORY (INHALATION)
Status: DISCONTINUED | OUTPATIENT
Start: 2024-06-20 | End: 2024-06-20 | Stop reason: HOSPADM

## 2024-06-20 RX ORDER — HYDRALAZINE HYDROCHLORIDE 20 MG/ML
10 INJECTION INTRAMUSCULAR; INTRAVENOUS
Status: DISCONTINUED | OUTPATIENT
Start: 2024-06-20 | End: 2024-06-20 | Stop reason: HOSPADM

## 2024-06-20 RX ORDER — DEXAMETHASONE SODIUM PHOSPHATE 10 MG/ML
INJECTION, SOLUTION INTRAMUSCULAR; INTRAVENOUS PRN
Status: DISCONTINUED | OUTPATIENT
Start: 2024-06-20 | End: 2024-06-20 | Stop reason: SDUPTHER

## 2024-06-20 RX ORDER — KETOROLAC TROMETHAMINE 30 MG/ML
INJECTION, SOLUTION INTRAMUSCULAR; INTRAVENOUS PRN
Status: DISCONTINUED | OUTPATIENT
Start: 2024-06-20 | End: 2024-06-20 | Stop reason: SDUPTHER

## 2024-06-20 RX ORDER — OXYCODONE HYDROCHLORIDE AND ACETAMINOPHEN 5; 325 MG/1; MG/1
1 TABLET ORAL EVERY 6 HOURS PRN
Qty: 28 TABLET | Refills: 0 | Status: SHIPPED | OUTPATIENT
Start: 2024-06-20 | End: 2024-06-27

## 2024-06-20 RX ORDER — MIDAZOLAM HYDROCHLORIDE 1 MG/ML
2 INJECTION INTRAMUSCULAR; INTRAVENOUS
Status: DISCONTINUED | OUTPATIENT
Start: 2024-06-20 | End: 2024-06-20 | Stop reason: HOSPADM

## 2024-06-20 RX ORDER — SODIUM CHLORIDE 9 MG/ML
INJECTION, SOLUTION INTRAVENOUS CONTINUOUS PRN
Status: DISCONTINUED | OUTPATIENT
Start: 2024-06-20 | End: 2024-06-20 | Stop reason: SDUPTHER

## 2024-06-20 RX ORDER — VANCOMYCIN HYDROCHLORIDE 1 G/20ML
INJECTION, POWDER, LYOPHILIZED, FOR SOLUTION INTRAVENOUS PRN
Status: DISCONTINUED | OUTPATIENT
Start: 2024-06-20 | End: 2024-06-20 | Stop reason: ALTCHOICE

## 2024-06-20 RX ORDER — LABETALOL HYDROCHLORIDE 5 MG/ML
10 INJECTION, SOLUTION INTRAVENOUS
Status: DISCONTINUED | OUTPATIENT
Start: 2024-06-20 | End: 2024-06-20 | Stop reason: HOSPADM

## 2024-06-20 RX ADMIN — SUGAMMADEX 200 MG: 100 INJECTION, SOLUTION INTRAVENOUS at 14:58

## 2024-06-20 RX ADMIN — KETOROLAC TROMETHAMINE 30 MG: 30 INJECTION, SOLUTION INTRAMUSCULAR at 15:10

## 2024-06-20 RX ADMIN — TRANEXAMIC ACID 1000 MG: 100 INJECTION, SOLUTION INTRAVENOUS at 14:20

## 2024-06-20 RX ADMIN — DEXAMETHASONE SODIUM PHOSPHATE 10 MG: 10 INJECTION, SOLUTION INTRAMUSCULAR; INTRAVENOUS at 15:10

## 2024-06-20 RX ADMIN — FENTANYL CITRATE 25 MCG: 50 INJECTION, SOLUTION INTRAMUSCULAR; INTRAVENOUS at 13:44

## 2024-06-20 RX ADMIN — FENTANYL CITRATE 50 MCG: 50 INJECTION, SOLUTION INTRAMUSCULAR; INTRAVENOUS at 13:53

## 2024-06-20 RX ADMIN — MORPHINE SULFATE 2 MG: 2 INJECTION, SOLUTION INTRAMUSCULAR; INTRAVENOUS at 01:44

## 2024-06-20 RX ADMIN — LIDOCAINE HYDROCHLORIDE 100 MG: 20 INJECTION, SOLUTION INTRAVENOUS at 13:53

## 2024-06-20 RX ADMIN — MORPHINE SULFATE 2 MG: 2 INJECTION, SOLUTION INTRAMUSCULAR; INTRAVENOUS at 08:44

## 2024-06-20 RX ADMIN — ONDANSETRON 4 MG: 2 INJECTION INTRAMUSCULAR; INTRAVENOUS at 15:10

## 2024-06-20 RX ADMIN — SODIUM CHLORIDE: 9 INJECTION, SOLUTION INTRAVENOUS at 13:43

## 2024-06-20 RX ADMIN — ROCURONIUM BROMIDE 40 MG: 100 INJECTION INTRAVENOUS at 13:53

## 2024-06-20 RX ADMIN — PROPOFOL 100 MG: 10 INJECTION, EMULSION INTRAVENOUS at 13:53

## 2024-06-20 RX ADMIN — FENTANYL CITRATE 25 MCG: 50 INJECTION, SOLUTION INTRAMUSCULAR; INTRAVENOUS at 14:38

## 2024-06-20 RX ADMIN — CEFAZOLIN 2000 MG: 2 INJECTION, POWDER, FOR SOLUTION INTRAMUSCULAR; INTRAVENOUS at 13:59

## 2024-06-20 RX ADMIN — ROCURONIUM BROMIDE 10 MG: 100 INJECTION INTRAVENOUS at 14:42

## 2024-06-20 ASSESSMENT — LIFESTYLE VARIABLES: SMOKING_STATUS: 0

## 2024-06-20 ASSESSMENT — PAIN SCALES - GENERAL
PAINLEVEL_OUTOF10: 8
PAINLEVEL_OUTOF10: 7
PAINLEVEL_OUTOF10: 7
PAINLEVEL_OUTOF10: 5

## 2024-06-20 ASSESSMENT — PAIN DESCRIPTION - ORIENTATION
ORIENTATION: LEFT
ORIENTATION: LEFT

## 2024-06-20 ASSESSMENT — PAIN DESCRIPTION - DESCRIPTORS
DESCRIPTORS: ACHING
DESCRIPTORS: DISCOMFORT;THROBBING;ACHING

## 2024-06-20 ASSESSMENT — PAIN SCALES - WONG BAKER
WONGBAKER_NUMERICALRESPONSE: NO HURT

## 2024-06-20 ASSESSMENT — PAIN DESCRIPTION - LOCATION
LOCATION: HIP
LOCATION: HIP;GROIN

## 2024-06-20 NOTE — DISCHARGE INSTRUCTIONS
Orthopedic Discharge Instructions:  Continue physical therapy.  Weight bear as tolerated surgical leg.  Continue hip precautions as instructed in therapy.  Take Asprin as prescribed for prevention of blood clots.  Take pain medication as prescribed.  Maintain Aquacel or Hugh dressing for 7 days.  May remove and shower thereafter.  Keep covered with dry dressing until drainage ceases.  Follow up in office in 2 weeks with Dr. Jennings, call for appointment.      WOUND CARE---  Cleanse left knee and coccyx with normal saline  Apply alginate and mepilex dressing  Change  mon wed and Friday and prn is needed

## 2024-06-20 NOTE — CONSULTS
Department of Orthopedic Surgery  Resident Consult Note          Reason for Consult: Left hip fracture    HISTORY OF PRESENT ILLNESS:       Patient is a 88 y.o. male who presents with left hip pain and left hip fracture.  Patient is alert and oriented x 3 upon examination.  Patient is unsure of how he hurt his left hip.  States he must of fallen at some point but does not remember.  Patient is complaining of pain only to the left hip and nowhere else on the body.  Patient states the pain is mainly in his left groin area.  Patient denies any numbness or tingling traveling down bilateral lower extremities.  Patient denies any other orthopedic complaints this time    Past Medical History:        Diagnosis Date    A-fib (HCC)     Atrial fibrillation (HCC)     CAD (coronary artery disease)     CHF (congestive heart failure) (HCC)     Chronic kidney disease     Diabetes mellitus (HCC)     DVT (deep venous thrombosis) (HCC)     Hyperlipidemia     Hypertension     Inguinal hernia, bilateral     Thrombocytopenia (HCC)      Past Surgical History:        Procedure Laterality Date    CORONARY ARTERY BYPASS GRAFT  2011    HEMORRHOID SURGERY      PERICARDIUM SURGERY       Current Medications:   Current Facility-Administered Medications: tamsulosin (FLOMAX) capsule 0.4 mg, 0.4 mg, Oral, Daily  escitalopram (LEXAPRO) tablet 20 mg, 20 mg, Oral, Daily  metFORMIN (GLUCOPHAGE) tablet 500 mg, 500 mg, Oral, Daily with breakfast  metoprolol tartrate (LOPRESSOR) tablet 25 mg, 25 mg, Oral, Daily  Vitamin D (CHOLECALCIFEROL) tablet 2,000 Units, 2,000 Units, Oral, Daily  [Held by provider] rivaroxaban (XARELTO) tablet 20 mg, 20 mg, Oral, Q24H  sodium chloride flush 0.9 % injection 5-40 mL, 5-40 mL, IntraVENous, 2 times per day  sodium chloride flush 0.9 % injection 5-40 mL, 5-40 mL, IntraVENous, PRN  0.9 % sodium chloride infusion, , IntraVENous, PRN  potassium chloride (KLOR-CON M) extended release tablet 40 mEq, 40 mEq, Oral, PRN **OR**  pain  NEUROLOGICAL:  negative for headaches, dizziness  BEHAVIOR/PSYCH:  negative for increased agitation and anxiety    PHYSICAL EXAM:    VITALS:  BP (!) 141/67   Pulse 64   Temp 99.3 °F (37.4 °C) (Oral)   Resp 16   Ht 1.803 m (5' 11\")   Wt 73.5 kg (162 lb)   SpO2 96%   BMI 22.59 kg/m²   CONSTITUTIONAL:  awake, alert, cooperative, no apparent distress, and appears stated age  MUSCULOSKELETAL:  Left lower Extremity:  +2/4 DP PT pulse good cap refill extremity warm perfused  Compartments soft compressible  Skin circumferentially intact  Left lower extremity held in shortened and externally rotated position  Positive EHL/DF/PF strength 4/5 when compared to contralateral extremity limited due to pain in the left hip  Positive logroll, patient unable to straight leg raise  Patient able to actively flex and extend at the knee with no pain  Tenderness to palpation about the left hip.  No tenderness palpation anywhere else on left lower extremity.    Secondary Exam:   bilateralUE: No obvious signs of trauma.  -TTP to fingers, hand, wrist, forearm, elbow, humerus, shoulder or clavicle.-- Patient able to flex/extend fingers, wrist, elbow and shoulder with active and passive ROM without pain, +2/4 Radial pulse, cap refill <3sec, +AIN/PIN/Radial/Ulnar/Median N, distal sensation grossly intact to C4-T1 dermatomes, compartments soft and compressible.    rightLE: No obvious signs of trauma.   -TTP to foot, ankle, leg, knee, thigh, hip.-- Patient able to flex/extend toes, ankle, knee and hip with active and passive ROM without pain,+2/4 DP & PT pulses, cap refill <3sec, +5/5 PF/DF/EHL, distal sensation grossly intact to L4-S1 dermatomes, compartments soft and compressible.        DATA:    CBC:   Lab Results   Component Value Date/Time    WBC 15.4 06/19/2024 04:25 PM    RBC 4.47 06/19/2024 04:25 PM    HGB 14.0 06/19/2024 04:25 PM    HCT 42.4 06/19/2024 04:25 PM    MCV 94.9 06/19/2024 04:25 PM    MCH 31.3 06/19/2024 04:25 PM

## 2024-06-20 NOTE — OP NOTE
Operative Note      Patient: Lonnie Pena  YOB: 1935  MRN: [id number]    Date of Procedure: 6/20/2024    Pre-Op Diagnosis Codes:     * S/p left hip fracture [Z87.81]    Post-Op Diagnosis: Same       Procedure(s):  LEFT HIP HEMIARTHROPLASTY    Surgeon(s):  Lonnie Jennings DO    Assistant:   Resident: Irving Ballard DO, Benoit Kindo, DO     Anesthesia: General    Estimated Blood Loss (mL): less than 100     Complications: None    Specimens:   ID Type Source Tests Collected by Time Destination   A : BONE LEFT HIP Bone Bone SURGICAL PATHOLOGY Lonnie Jennings DO 6/20/2024 1451        Implants:  Implant Name Type Inv. Item Serial No.  Lot No. LRB No. Used Action   HEAD BPLR OD57MM [id number] FEM HIP CHRISTINE POS ECC SELF CNTR - [id number]  HEAD BPLR OD57MM [id number] FEM HIP CHRISTINE POS ECC SELF CNTR  [facility] [employer] [id number] Left 1 Implanted   STEM FEM SZ 10 L115MM 12/14 TAPR HI OFFSET HIP DUOFIX CLLRD - [id number]  STEM FEM SZ 10 L115MM 12/14 TAPR HI OFFSET HIP DUOFIX CLLRD  [facility] [employer]  Left 1 Implanted   HEAD FEM [id number] +8.5MM OFFSET HIP ULT STD ARTC EZ 12/14 - [id number]  HEAD FEM [id number] +8.5MM OFFSET HIP ULT STD ARTC EZ 12/14  [facility] [employer] [id number] Left 1 Implanted         Drains: * No LDAs found *    Findings:  Closed, left displaced femoral neck fracture  Osteopenia  Infection Present At Time Of Surgery (PATOS) (choose all levels that have infection present):  No infection present    Brief history:  Patient is an 88-year-old male with advanced underlying dementia who suffered a fall at his nursing home.  Patient fell out of bed and was unable to ambulate after the fall.  Was brought to [facility] for further evaluation and management.  X-rays of the left hip and pelvis demonstrated a displaced left femoral neck fracture as well as osteopenia.  Orthopedic surgery was consulted.  Lengthy discussion was had with the  line with our incision.  The short external rotators were identified.  Hohmann elevator placed under the gluteal musculature and piriformis tendon was identified ligated and tagged, gave us good look at the capsule which was then incised on the superior border of where the piriformis tendon was.  Fracture Mattone was not immediately encountered which was suctioned out and irrigated.  Demonstrating a left femoral neck fracture with basicervical extension down to nearly the level of the lesser trochanter.  The femoral head was removed and measured to be a 59 mm.  Next visit patient's leg was positioned for preparation of the intramedullary canal.  Box osteotome, canal finder and then sequential broaches were used until good fit and fill was appreciated at a size 11 stem.  High offset neck and +8.5 mm ball were used.  The hip was ranged in all directions and found to be exceptionally stable.    Next trial components were removed, the surgical field was copiously irrigated with normal saline as well as Irrisept.  Next the final implants were inserted and the hip was reduced without any incarcerated tissue.  Once again taken through range of motion found to be stable.  The wound was then copiously irrigated again with normal saline as well as Irrisept.  Capsule was closed with #2 suture tape, IT band was closed with remainder of the suture tape as well as a running strata fix suture.  Subcutaneous closed with 2-0 Vicryl and skin closed with staples.  An Aquacel dressing was applied.  Patient was then transferred back to the hospital bed by multiple surgical team in safe fashion with anesthesia again and control the C-spine and airway.  Patient was reversed from anesthesia and went to PACU in stable condition.    Postoperative disposition:  Patient will be weightbearing as tolerated to the operative extremity, left.  He will work with physical therapy while in house.  Will be started on DVT prophylaxis in form of aspirin

## 2024-06-20 NOTE — PROGRESS NOTES
Date:2024  Patient Name: Lonnie Pena  MRN: [id number]  : 1935  ROOM #: [id number]/[id number]    Occupational Therapy order received, chart reviewed and evaluation attempted this date. Patient is unavailable for OT evaluation due to scheduled sx this date for Left hip hemiarthroplasty.    Will attempt OT evaluation at a later time. Thank you.   Albaro Renee OTR/L #[id number]

## 2024-06-20 NOTE — ANESTHESIA POSTPROCEDURE EVALUATION
Department of Anesthesiology  Postprocedure Note    Patient: Lonnie Pena  MRN: [id number]  YOB: 1935  Date of evaluation: 6/20/2024    Procedure Summary       Date: 06/20/24 Room / Location: [address]    Anesthesia Start: 1343 Anesthesia Stop: 1526    Procedure: LEFT HIP HEMIARTHROPLASTY (Left: Hip) Diagnosis:       S/p left hip fracture      (S/p left hip fracture [Z87.81])    Surgeons: Lonnie Jennings DO Responsible Provider: Rodrigo Pierre MD    Anesthesia Type: general ASA Status: 3            Anesthesia Type: No value filed.    Duane Phase I: Duane Score: 10    Duane Phase II:      Anesthesia Post Evaluation    Patient location during evaluation: PACU  Patient participation: complete - patient participated  Level of consciousness: awake  Airway patency: patent  Nausea & Vomiting: no nausea and no vomiting  Cardiovascular status: hemodynamically stable  Respiratory status: acceptable  Hydration status: euvolemic  Pain management: adequate        No notable events documented.

## 2024-06-20 NOTE — ACP (ADVANCE CARE PLANNING)
Advance Care Planning   Healthcare Decision Maker:    Primary Decision Maker: KETTY LAMB - [facility] - [phone]    Primary Decision Maker: MARIVEL LAMB JR [facility] [phone]

## 2024-06-20 NOTE — PROGRESS NOTES
4 Eyes Skin Assessment     NAME:  Lonnie Pena  YOB: 1935  MEDICAL RECORD NUMBER:  [id number]    The patient is being assessed for  Admission    I agree that at least one RN has performed a thorough Head to Toe Skin Assessment on the patient. ALL assessment sites listed below have been assessed.      Areas assessed by both nurses:    Head, Face, Ears, Shoulders, Back, Chest, Arms, Elbows, Hands, Sacrum. Buttock, Coccyx, Ischium, Legs. Feet and Heels, and Under Medical Devices    Has a blistering skin issue         Does the Patient have a Wound? Yes wound(s) were present on assessment. LDA wound assessment was Initiated and completed by RN       Doyle Prevention initiated by RN: Yes  Wound Care Orders initiated by RN: No    Pressure Injury (Stage 3,4, Unstageable, DTI, NWPT, and Complex wounds) if present, place Wound referral order by RN under : No    New Ostomies, if present place, Ostomy referral order under : No     Nurse 1 eSignature: Electronically signed by Rima Mancia RN on 6/20/24 at 12:29 AM EDT    **SHARE this note so that the co-signing nurse can place an eSignature**    Nurse 2 eSignature: {Esignature:[id number]}

## 2024-06-20 NOTE — ANESTHESIA PRE PROCEDURE
Department of Anesthesiology  Preprocedure Note       Name:  Lonnie Pena   Age:  88 y.o.  :  1935                                          MRN:  [id number]         Date:  2024      Surgeon: Surgeon(s):  Lonnie Jennings DO    Procedure: Procedure(s):  LEFT HIP HEMIARTHROPLASTY    Medications prior to admission:   Prior to Admission medications    Medication Sig Start Date End Date Taking? Authorizing Provider   metoprolol tartrate (LOPRESSOR) 25 MG tablet Take 1 tablet by mouth daily 3/19/19   Rinku Gaxiola MD   escitalopram (LEXAPRO) 20 MG tablet Take 20 mg by mouth daily    Thee Gallegos MD   metFORMIN (GLUCOPHAGE) 500 MG tablet Take 500 mg by mouth daily (with breakfast)    Thee Gallegos MD   Cholecalciferol (VITAMIN D3 PO) Take 125 mcg by mouth daily    Thee Gallegos MD   tamsulosin (FLOMAX) 0.4 MG capsule Take 0.4 mg by mouth daily    Thee Gallegos MD   rivaroxaban (XARELTO) 20 MG TABS tablet Take 20 mg by mouth every 24 hours    Thee Gallegos MD       Current medications:    Current Facility-Administered Medications   Medication Dose Route Frequency Provider Last Rate Last Admin    tamsulosin (FLOMAX) capsule 0.4 mg  0.4 mg Oral Daily Alok Minaya MD        escitalopram (LEXAPRO) tablet 20 mg  20 mg Oral Daily Alok Minaya MD        metFORMIN (GLUCOPHAGE) tablet 500 mg  500 mg Oral Daily with breakfast Alok Minaya MD        metoprolol tartrate (LOPRESSOR) tablet 25 mg  25 mg Oral Daily Alok Minaya MD        Vitamin D (CHOLECALCIFEROL) tablet 2,000 Units  2,000 Units Oral Daily Alok Minaya MD        [Held by provider] rivaroxaban (XARELTO) tablet 20 mg  20 mg Oral Q24H Alok Minaya MD        sodium chloride flush 0.9 % injection 5-40 mL  5-40 mL IntraVENous 2 times per day Alok Minaya MD   10 mL at 24 2130    sodium chloride flush 0.9 % injection 5-40 mL  5-40 mL IntraVENous PRN Alok Minaya MD        0.9 % sodium chloride

## 2024-06-20 NOTE — PROGRESS NOTES
Primary Care Physician: Alok Minaya MD   Admitting Physician:  Alok Minaya MD  Admission date and time: 6/19/2024  3:52 PM    Room:  [address] [place] [address]    Admitting diagnosis:   Left hip fracture  Atrial fibrillation  Coronary artery disease  Bullous pemphigoid  Depression  Dementia  Hypertension  Hyperlipidemia  Benign prostatic hypertrophy  History of fall  Acute pain secondary to left hip fracture  Type 2 diabetes  History of DVT      Patient Name: Lonnie Pena  MRN: [id number]    Date of Service: 6/20/2024     Subjective:  Lonnie is a 88 y.o. male who was seen and examined today,6/20/2024, at the bedside.  Patient has advanced dementia he is complaining of left hip pain patient is not very talkative no nausea vomiting no chest pain no shortness of breath.  Will be going for surgery today      No family present during my examination.    Review of System:   Constitutional:   Denies fever or chills, weight loss or gain, fatigue or malaise.  HEENT:   Denies ear pain, sore throat, sinus or eye problems.  Cardiovascular:   Denies any chest pain, irregular heartbeats, or palpitations.   Respiratory:   Denies shortness of breath, coughing, sputum production, hemoptysis, or wheezing.  Gastrointestinal:   Denies nausea, vomiting, diarrhea, or constipation.  Denies any abdominal pain.  Genitourinary:    Denies any urgency, frequency, hematuria. Voiding  without difficulty.  Extremities:   Positive for left hip pain  Neurology:    Denies any headache or focal neurological deficits, Denies generalized weakness or memory difficulty.   Psch:   Denies being anxious or depressed.  Musculoskeletal:    Denies  myalgias, joint complaints or back pain.   Integumentary:   Denies any rashes, ulcers, or excoriations.  Denies bruising.  Hematologic/Lymphatic:  Denies bruising or bleeding.    Physical Exam:  No intake/output data recorded.  No intake or output data in the 24 hours ending 06/20/24 0917No intake/output data  06/20/2024 04:36 AM     CMP:    Lab Results   Component Value Date/Time     06/19/2024 04:25 PM    K 4.2 06/19/2024 04:25 PM    K 4.2 03/19/2019 06:21 AM     06/19/2024 04:25 PM    CO2 22 06/19/2024 04:25 PM    BUN 25 06/19/2024 04:25 PM    CREATININE 1.3 06/19/2024 04:25 PM    GFRAA >60 03/19/2019 06:21 AM    LABGLOM 54 06/19/2024 04:25 PM    GLUCOSE 100 06/19/2024 04:25 PM    GLUCOSE 117 03/27/2012 09:14 AM    CALCIUM 9.3 06/19/2024 04:25 PM    BILITOT 0.3 03/17/2019 09:50 PM    ALKPHOS 51 03/17/2019 09:50 PM    AST 46 03/17/2019 09:50 PM    ALT 37 03/17/2019 09:50 PM     Albumin:  No results found for: \"LABALBU\"    XR FEMUR LEFT (MIN 2 VIEWS)   Final Result   1. Impacted and foreshortened left femoral neck fracture.   2. Mildly comminuted parasymphyseal left pubic rami fractures.         XR HIP 2-3 VW W PELVIS LEFT   Final Result   Left femoral neck fracture.         CT HEAD WO CONTRAST   Final Result   1.  There is no acute intracranial abnormality.  Specifically, there is no   intracranial hemorrhage.   2. Atrophy and periventricular microvascular ischemic disease   .         CT CERVICAL SPINE WO CONTRAST   Final Result   1. There is no acute compression fracture or subluxation of the cervical   spine.   2. Multilevel degenerative disc and degenerative joint disease.         CT THORACIC SPINE WO CONTRAST   Final Result   1. Moderate probable chronic anterior wedge compression deformity of T9. No   retropulsion of fragments.   2. Moderate osteopenia.   3. Multilevel degenerative changes.         CT LUMBAR SPINE WO CONTRAST   Final Result   1. No acute fracture or malalignment of the lumbar spine.   2. Moderate to severe multilevel facet arthropathy throughout the lumbar   spine.   3. Probable chronic mild circumferential canal stenosis at L3-4 due to   bulging disc and facet arthropathy.   4. Fusiform infrarenal abdominal aortic aneurysm measuring up to 3.3 cm in   diameter.      RECOMMENDATIONS:    Follow-up imaging of the abdominal aortic aneurysm every 3 years recommended.             Wound Documentation:        Assessment:  Left hip fracture  Atrial fibrillation  Coronary artery disease  Bullous pemphigoid  Depression  Dementia  Hypertension  Hyperlipidemia  Benign prostatic hypertrophy  History of fall  Acute pain secondary to left hip fracture  Type 2 diabetes  History of DVT    Plan:   Admit the patient  Consult orthopedics  Pain management  DVT GI prophylaxis  Continue home medications  Medically cleared for surgery  PT OT  Social service for discharge planning    Continue current therapy.  See orders for further plan of care.      More than 50% of my time was spent at the bedside counseling/coordinating care with the patient and/or family with face to face contact.  This time was spent reviewing notes and laboratory data as well as instructing and counseling the patient. Time I spent with the family or surrogate(s) is included only if the patient was incapable of providing the necessary information or participating in medical decisions. I also discussed the differential diagnosis and all of the proposed management plans with the patient and individuals accompanying the patient. I am readily available for any further decision-making and intervention.       Electronically signed by Alok Minaya MD on 6/20/2024 at 9:17 AM

## 2024-06-20 NOTE — PROGRESS NOTES
Consult received regarding left femoral neck fracture:    Imaging left hip and pelvis: Demonstrate transcervical femoral neck fracture with high Powells angle.  No other acute fractures or abnormalities noted about the pelvis.    X-ray left femur pending    Plan:  Patient be made n.p.o. at 0015 on 6/20/2024  Ancef and TXA on-call the OR  Procedure consents put in  Medical management appreciated  Plan: Patient will be added on for left hip hemiarthroplasty on 6/20/2024 with Dr. Jennings.  Formal consult note to follow  D/W attending    Electronically signed by Laith Rebolledo DO on 6/19/2024 at 8:16 PM

## 2024-06-20 NOTE — PROGRESS NOTES
Physical Therapy  Physical Therapy    Room #: 0623/0623-01  Date: 2024       Patient Name: Lonnie Pena  : 1935      MRN: [id number]     Patient unavailable for physical therapy eval due to unavailable/not appropriate per nursing due to  awaiting sx for Left hip hemiarthroplasty. . Will attempt PT evaluation at a later time. Thank you.       Bucky Hwang, PT

## 2024-06-21 PROCEDURE — 6370000000 HC RX 637 (ALT 250 FOR IP): Performed by: INTERNAL MEDICINE

## 2024-06-21 PROCEDURE — 6360000002 HC RX W HCPCS

## 2024-06-21 PROCEDURE — 2060000000 HC ICU INTERMEDIATE R&B

## 2024-06-21 PROCEDURE — 2580000003 HC RX 258: Performed by: INTERNAL MEDICINE

## 2024-06-21 PROCEDURE — 97161 PT EVAL LOW COMPLEX 20 MIN: CPT | Performed by: PHYSICAL THERAPIST

## 2024-06-21 PROCEDURE — 97110 THERAPEUTIC EXERCISES: CPT | Performed by: PHYSICAL THERAPIST

## 2024-06-21 PROCEDURE — 97530 THERAPEUTIC ACTIVITIES: CPT | Performed by: PHYSICAL THERAPIST

## 2024-06-21 PROCEDURE — 97530 THERAPEUTIC ACTIVITIES: CPT | Performed by: OCCUPATIONAL THERAPIST

## 2024-06-21 PROCEDURE — 97165 OT EVAL LOW COMPLEX 30 MIN: CPT | Performed by: OCCUPATIONAL THERAPIST

## 2024-06-21 PROCEDURE — 99223 1ST HOSP IP/OBS HIGH 75: CPT | Performed by: INTERNAL MEDICINE

## 2024-06-21 RX ORDER — METOPROLOL SUCCINATE 25 MG/1
25 TABLET, EXTENDED RELEASE ORAL DAILY
Status: DISCONTINUED | OUTPATIENT
Start: 2024-06-21 | End: 2024-06-27 | Stop reason: HOSPADM

## 2024-06-21 RX ADMIN — Medication 10 ML: at 22:49

## 2024-06-21 RX ADMIN — MORPHINE SULFATE 2 MG: 2 INJECTION, SOLUTION INTRAMUSCULAR; INTRAVENOUS at 22:59

## 2024-06-21 RX ADMIN — Medication 2000 UNITS: at 08:51

## 2024-06-21 RX ADMIN — METOPROLOL TARTRATE 25 MG: 25 TABLET, FILM COATED ORAL at 08:51

## 2024-06-21 RX ADMIN — MORPHINE SULFATE 2 MG: 2 INJECTION, SOLUTION INTRAMUSCULAR; INTRAVENOUS at 02:26

## 2024-06-21 RX ADMIN — RIVAROXABAN 20 MG: 20 TABLET, FILM COATED ORAL at 22:48

## 2024-06-21 RX ADMIN — Medication 10 ML: at 08:51

## 2024-06-21 RX ADMIN — TAMSULOSIN HYDROCHLORIDE 0.4 MG: 0.4 CAPSULE ORAL at 08:51

## 2024-06-21 RX ADMIN — ESCITALOPRAM OXALATE 20 MG: 10 TABLET ORAL at 08:51

## 2024-06-21 RX ADMIN — METOPROLOL SUCCINATE 25 MG: 25 TABLET, EXTENDED RELEASE ORAL at 18:53

## 2024-06-21 ASSESSMENT — PAIN SCALES - WONG BAKER
WONGBAKER_NUMERICALRESPONSE: NO HURT

## 2024-06-21 ASSESSMENT — PAIN DESCRIPTION - ORIENTATION
ORIENTATION: LEFT
ORIENTATION: LEFT

## 2024-06-21 ASSESSMENT — PAIN DESCRIPTION - DESCRIPTORS
DESCRIPTORS: THROBBING;ACHING
DESCRIPTORS: THROBBING;ACHING

## 2024-06-21 ASSESSMENT — PAIN DESCRIPTION - LOCATION
LOCATION: HIP
LOCATION: HIP

## 2024-06-21 ASSESSMENT — PAIN DESCRIPTION - FREQUENCY: FREQUENCY: INTERMITTENT

## 2024-06-21 ASSESSMENT — PAIN SCALES - GENERAL
PAINLEVEL_OUTOF10: 7
PAINLEVEL_OUTOF10: 5
PAINLEVEL_OUTOF10: 9

## 2024-06-21 ASSESSMENT — PAIN DESCRIPTION - PAIN TYPE: TYPE: ACUTE PAIN

## 2024-06-21 NOTE — PROGRESS NOTES
Primary Care Physician: Alok Minaya MD   Admitting Physician:  Alok Minaya MD  Admission date and time: 6/19/2024  3:52 PM    Room:  [address] [place] [address]    Admitting diagnosis:   Left hip fracture  Atrial fibrillation  Coronary artery disease  Bullous pemphigoid  Depression  Dementia  Hypertension  Hyperlipidemia  Benign prostatic hypertrophy  History of fall  Acute pain secondary to left hip fracture  Type 2 diabetes  History of DVT      Patient Name: Lonnie Pena  MRN: [id number]    Date of Service: 6/21/2024     Subjective:  Lonnie is a 88 y.o. male who was seen and examined today,6/21/2024, at the bedside.  Patient has advanced dementia he is Status post left hip arthroplasty , offers no complaints currently laying down comfortably      No family present during my examination.    Review of System:   Constitutional:   Denies fever or chills, weight loss or gain, fatigue or malaise.  HEENT:   Denies ear pain, sore throat, sinus or eye problems.  Cardiovascular:   Denies any chest pain, irregular heartbeats, or palpitations.   Respiratory:   Denies shortness of breath, coughing, sputum production, hemoptysis, or wheezing.  Gastrointestinal:   Denies nausea, vomiting, diarrhea, or constipation.  Denies any abdominal pain.  Genitourinary:    Denies any urgency, frequency, hematuria. Voiding  without difficulty.  Extremities:   Positive for Minimal left hip pain,Status post left hip arthroplasty  Neurology:    Denies any headache or focal neurological deficits, Denies generalized weakness or memory difficulty.   Psch:   Denies being anxious or depressed.  Musculoskeletal:    Denies  myalgias, joint complaints or back pain.   Integumentary:   Denies any rashes, ulcers, or excoriations.  Denies bruising.  Hematologic/Lymphatic:  Denies bruising or bleeding.    Physical Exam:  No intake/output data recorded.    Intake/Output Summary (Last 24 hours) at 6/21/2024 1349  Last data filed at 6/20/2024 1858  Gross per 24  06/20/2024 04:36 AM     CMP:    Lab Results   Component Value Date/Time     06/19/2024 04:25 PM    K 4.2 06/19/2024 04:25 PM    K 4.2 03/19/2019 06:21 AM     06/19/2024 04:25 PM    CO2 22 06/19/2024 04:25 PM    BUN 25 06/19/2024 04:25 PM    CREATININE 1.3 06/19/2024 04:25 PM    GFRAA >60 03/19/2019 06:21 AM    LABGLOM 54 06/19/2024 04:25 PM    GLUCOSE 100 06/19/2024 04:25 PM    GLUCOSE 117 03/27/2012 09:14 AM    CALCIUM 9.3 06/19/2024 04:25 PM    BILITOT 0.3 03/17/2019 09:50 PM    ALKPHOS 51 03/17/2019 09:50 PM    AST 46 03/17/2019 09:50 PM    ALT 37 03/17/2019 09:50 PM     Albumin:  No results found for: \"LABALBU\"    XR HIP LEFT (2-3 VIEWS)   Final Result   1st postoperative study after placement of a left hip prosthesis.  The left   hip prosthesis is in proper position.         XR FEMUR LEFT (MIN 2 VIEWS)   Final Result   1. Impacted and foreshortened left femoral neck fracture.   2. Mildly comminuted parasymphyseal left pubic rami fractures.         XR HIP 2-3 VW W PELVIS LEFT   Final Result   Left femoral neck fracture.         CT HEAD WO CONTRAST   Final Result   1.  There is no acute intracranial abnormality.  Specifically, there is no   intracranial hemorrhage.   2. Atrophy and periventricular microvascular ischemic disease   .         CT CERVICAL SPINE WO CONTRAST   Final Result   1. There is no acute compression fracture or subluxation of the cervical   spine.   2. Multilevel degenerative disc and degenerative joint disease.         CT THORACIC SPINE WO CONTRAST   Final Result   1. Moderate probable chronic anterior wedge compression deformity of T9. No   retropulsion of fragments.   2. Moderate osteopenia.   3. Multilevel degenerative changes.         CT LUMBAR SPINE WO CONTRAST   Final Result   1. No acute fracture or malalignment of the lumbar spine.   2. Moderate to severe multilevel facet arthropathy throughout the lumbar   spine.   3. Probable chronic mild circumferential canal stenosis at

## 2024-06-21 NOTE — PROGRESS NOTES
OCCUPATIONAL THERAPY INITIAL EVALUATION    [facility]  [address] [facility]         Date:2024                                                   Patient Name: Lonnie Pena     MRN: [id number]     : 1935     Room: [address] [place] [address]       Evaluating OT: Genia Guillen, OTR/L; [id number]       Referring Provider and Orders/Date:    OT eval and treat  Start:  24,   End:  24,   ONE TIME,   Standing Count:  1 Occurrences,   R       Order went unreviewed at transfer on   8:04 PM  Alok Minaya MD    Recommended placement: subacute rehab       Diagnosis:   1. Closed fracture of left hip, initial encounter ([facility])    2. S/p left hip fracture    3. Closed left hip fracture, initial encounter ([facility])         Surgery: Left hip hemiarthroplasty        Pertinent Medical History:        Past Medical History:   Diagnosis Date    A-fib ([facility])     Atrial fibrillation ([facility])     CAD (coronary artery disease)     CHF (congestive heart failure) ([facility])     Chronic kidney disease     Diabetes mellitus ([facility])     DVT (deep venous thrombosis) ([facility])     Hyperlipidemia     Hypertension     Inguinal hernia, bilateral     Thrombocytopenia (HCC)           Past Surgical History:   Procedure Laterality Date    CORONARY ARTERY BYPASS GRAFT      HEMORRHOID SURGERY      HIP SURGERY Left 2024    LEFT HIP HEMIARTHROPLASTY performed by Lonnie Jennings DO at [facility] OR    PERICARDIUM SURGERY         Precautions:  Fall Risk-PTA, Weightbearing as tolerated left lower extremity, posterior hip precautions, advanced dementia        Assessment of current deficits     [x] Functional mobility  [x]ADLs  [x] Strength               [x]Cognition     [x] Functional transfers   [x] IADLs         [x] Safety Awareness   [x]Endurance     [] Fine Coordination              [x] Balance      [] Vision/perception   []Sensation      [x]Gross Motor Coordination  []  Vitals with activity:poor+ due to fatigue in the PM    Increase sitting tolerance for >10min with stable vital signs for carry over into toileting, functional tranfers and indep in ADLs   Visual/  Perceptual Glasses: Present; WFL    Reports change in vision since admission: No     NA     Hand Dominance  [x] Right  [] Left    AROM (PROM) Strength Additional Info:  Goal:   RUE  WFL 4/5 good  and  FMC/dexterity noted during ADL tasks  Opposition [x] Intact [] Impaired  Finger to nose [x] Intact [] Impaired 5/5MMT generally for carry over into self care, functional transfers and functional mobility with AD.    LUE WFL 4/5 good  and  FMC/dexterity noted during ADL tasks  Opposition [x] Intact [] Impaired  Finger to nose [x] Intact [] Impaired 5/5MMT generally for carry over into self care, functional transfers and functional mobility with AD.      Hearing: WFL   Sensation:  No c/o numbness or tingling   Tone: WFL   Edema: none    Comments: Upon arrival patient supine. The biggest barriers reflect that of functional transfers, functional mobility, UB/LB ADLs, cognition, activity tolerance, balance, safety and strengthening. At end of session, patient supine with call light and phone within reach, all lines and tubes intact.  Overall patient demonstrated decreased independence and safety during completion of ADL/functional transfer/mobility tasks compared to PLOF. Nursing updated on pt position and status following OT eval. Pt would benefit from continued skilled OT to increase safety and independence with completion of ADL/IADL tasks for functional independence and quality of life.    Treatment: OT treatment provided this date includes:  Instruction, education and training on safe facilitation and adapted techniques for completion of ADLs. These include neuromuscular reeducation to facilitate balance/righting reactions,proper positioning/alignment to improve interaction with environment and overall function and on

## 2024-06-21 NOTE — PROGRESS NOTES
Department of Orthopedic Surgery   Progress Note    Patient seen and examined. Pain controlled. No new complaints.  Denies chest pain, shortness of breath, calf pain, dizziness/lightheadedness, fevers or chills.     VITALS:  /63   Pulse 75   Temp 98.6 °F (37 °C) (Oral)   Resp 18   Ht 1.803 m (5' 11\")   Wt 64.7 kg (142 lb 9.6 oz)   SpO2 95%   BMI 19.89 kg/m²     GENERAL: In bed, comfortable  MUSCULOSKELETAL:   left lower extremity:  Dressing C/D/I  Compartments soft and compressible, calf non-tender  Palpable dorsalis pedis and posterior tibialis pulse, brisk cap refill to toes, foot warm and perfused  Sensation intact to light touch in sural/deep peroneal/superficial peroneal/saphenous/posterior tibial nerve distributions to foot/ankle.  Demonstrates active ankle plantar/dorsiflexion/great toe extension    CBC:   Lab Results   Component Value Date/Time    WBC 11.5 06/20/2024 04:36 AM    HGB 12.9 06/20/2024 04:36 AM    HCT 38.7 06/20/2024 04:36 AM     06/20/2024 04:36 AM       ASSESSMENT  Left hip hemiarthroplasty 6/20    PLAN    Weightbearing as tolerated left lower extremity, posterior hip precautions  Postop Ancef for surgical prophylaxis completed  DVT prophylaxis.  Okay to restart Xarelto today  PT/OT as amenable  Pain control with moderate LV/p.o. per admitting team  DC pending multiple considerations    Electronically signed by Richard Nur DO on 6/21/2024 at 6:44 AM

## 2024-06-21 NOTE — PROGRESS NOTES
Unable to interrogate pacer at this time due to [employer] intrerrogator not working. family states they will bring the patients that links to [facility]. Patient is asymptomatic with intermittent bradycardia xarelto restarted per ortho

## 2024-06-21 NOTE — CARE COORDINATION
Case Management Assessment  Initial Evaluation    Date/Time of Evaluation: 6/21/2024 9:09 AM  Assessment Completed by: Brooklynn Maloney RN    If patient is discharged prior to next notation, then this note serves as note for discharge by case management.    Patient Name: Lonnie Lamb                   YOB: 1935  Diagnosis: Closed fracture of left hip, initial encounter ([facility]) [S72.002A]  Closed left hip fracture, initial encounter ([facility]) [S72.002A]  Hip fracture, left, closed, initial encounter ([facility]) [S72.002A]                   Date / Time: 6/19/2024  3:52 PM    Patient Admission Status: Inpatient   Readmission Risk (Low < 19, Mod (19-27), High > 27): Readmission Risk Score: 12.9    Current PCP: Alok Minaya MD  PCP verified by CM? Yes    Chart Reviewed: Yes      History Provided by: Child/Family  Patient Orientation: Alert and Oriented, Person, Place, Situation, Self    Patient Cognition: Alert    Hospitalization in the last 30 days (Readmission):  No    If yes, Readmission Assessment in  Navigator will be completed.    Advance Directives:      Code Status: Full Code   Patient's Primary Decision Maker is: Legal Next of Kin    Primary Decision Maker: KETTY LAMB [facility] [employer] - [phone]    Primary Decision Maker: LONNIE LAMB [facility] - [phone]    Discharge Planning:    Patient lives with: Other (Comment) Type of Home: Assisted living  Primary Care Giver: Other (Comment) ([facility] staff Assists him)  Patient Support Systems include: Children, Family Members, Other (Comment) ([facility] staff)   Current Financial resources:    Current community resources:    Current services prior to admission: None            Current DME:              Type of Home Care services:  PT, OT, Skilled Therapy    ADLS  Prior functional level: Assistance with the following:, Bathing, Cooking, Housework, Shopping  Current functional level: Assistance with the following:, Bathing, Dressing, Toileting, Cooking, Housework,  Shopping, Mobility    PT AM-PAC:   /24  OT AM-PAC:   /24    Family can provide assistance at DC: No  Would you like Case Management to discuss the discharge plan with any other family members/significant others, and if so, who? Yes (Pt's sons)  Plans to Return to Present Housing: No (Will go to SNF before returning to [facility])  Other Identified Issues/Barriers to RETURNING to current housing: pt will go to rehab before returning to [place]  Potential Assistance needed at discharge: Outpatient PT/OT            Potential DME:    Patient expects to discharge to: Skilled nursing facility  Plan for transportation at discharge:      Financial    Payor: MEDICARE / Plan: MEDICARE PART A AND B / Product Type: *No Product type* /     Does insurance require precert for SNF: No    Potential assistance Purchasing Medications:    Meds-to-Beds request: Yes      [facility] [place] [address] [address] - P [phone] - F [phone]  [address] [place] [address] [facility] [address]  Phone: [phone] Fax: [phone]      Notes:    Factors facilitating achievement of predicted outcomes: Family support and Caregiver support    Barriers to discharge: Pain and Medication managment    Additional Case Management Notes: 6/21/2024 0900 CM Note: Spoke with pt's son Richard via phone.  Pt resides at [facility]. The staff assists him with showers, dressing, meals but otherwise somewhat independent. Pt will go to [facility] at d/c for rehab before returning to [place].  Melisa clemente is following and pt will need a 3 night qualifying stay. NO PRECERT NEEDED, WILL NEED HENS(initiated) and a signed ABILIO. PCP is Dr Minaya.  CM will follow. Electronically signed by Brooklynn Maloney RN on 6/21/2024 at 9:53 AM   Addendum 1025 Per Melisa clemente at [facility] they will have a bed for him if he's ready to d/c over the weekend.  Electronically signed by Brooklynn Maloney RN on 6/21/2024 at 10:27 AM                                               Brooklynn Maloney  RN  Case Management Department

## 2024-06-21 NOTE — CONSULTS
Inpatient CARDIOLOGY Consultation        Reason for Consult:      Date of Consultation: 6/21/2024    Patient follows at [facility].       HISTORY OF PRESENT ILLNESS:  88 year old with history of Atrial fibrillation since 2011, CAD s/p CABG x1, s/p TRrp in 2011, s/p Pacemaker, CMP, VHD,  chronic kidney disease type 2 diabetes DVT hyperlipidemia hypertension admitted after hip fracture    Cardiology consulted for 'bradycardia, intermittent wide complex beats\"    Denies CP, heart racing or dizzy.  He syncope. No orthopnea.    Compliant with his medications.    REVIEW OF SYSTEMS:   Review of rest of 12 systems negative except as mentioned in HPI.        Please note: past medical records were reviewed per electronic medical record (EMR) - see detailed reports under Past Medical/ Surgical History.       Past Medical History:    Past Medical History:   Diagnosis Date    A-fib (HCC)     Atrial fibrillation (HCC)     CAD (coronary artery disease)     CHF (congestive heart failure) (HCC)     Chronic kidney disease     Diabetes mellitus (HCC)     DVT (deep venous thrombosis) (HCC)     Hyperlipidemia     Hypertension     Inguinal hernia, bilateral     Thrombocytopenia (HCC)    Pacemaker  constrictive pericarditis s/p CABG x1, TV repair and pericardiectomy - 2011  Chronic systolic HF - Follows with Dr Goddard       Past Surgical History:    Past Surgical History:   Procedure Laterality Date    CORONARY ARTERY BYPASS GRAFT  2011    HEMORRHOID SURGERY      HIP SURGERY Left 6/20/2024    LEFT HIP HEMIARTHROPLASTY performed by Lonnie Jennings DO at [facility] OR    PERICARDIUM SURGERY       11/8/2011 - Pericardiectomy, coronary bypass grafting x1 with the in situ left internal thoracic artery to the LAD, and tricuspid valve repair with a #28 classic Charles ring for Constrictive pericarditis     Allergies:    Adhesive tape and Augmentin  aortic valve regurgitation.   - Exam was compared with the prior  echocardiographic exam performed on 12/15/2022. Valvular disease has progressed.       LABS:  Reviewed    Cardiac enzymes:No results for input(s): \"CKTOTAL\", \"TROPHS\" in the last 72 hours.  CBC:   Recent Labs     06/19/24  1625 06/20/24  0436   WBC 15.4* 11.5   HGB 14.0 12.9   HCT 42.4 38.7    196     BMP:   Recent Labs     06/19/24  1625      K 4.2   CO2 22   BUN 25*   CREATININE 1.3*   LABGLOM 54*   CALCIUM 9.3     Mag: No results for input(s): \"MG\" in the last 72 hours.  Phos: No results for input(s): \"PHOS\" in the last 72 hours.  TSH: No results for input(s): \"TSH\" in the last 72 hours.  HgA1c:   Lab Results   Component Value Date    LABA1C 6.0 (H) 03/18/2019     No results found for: \"EAG\"  proBNP: No results for input(s): \"PROBNP\" in the last 72 hours.  PT/INR:   Recent Labs     06/19/24  1625   PROTIME 15.2*   INR 1.4     APTT:No results for input(s): \"APTT\" in the last 72 hours.  FASTING LIPID PANEL:  Lab Results   Component Value Date/Time    CHOL 118 07/03/2017 09:14 AM    HDL 58 03/18/2019 06:14 AM    TRIG 64 07/03/2017 09:14 AM     LIVER PROFILE:No results for input(s): \"AST\", \"ALT\", \"LABALBU\" in the last 72 hours.    Current Inpatient Medications:   tamsulosin  0.4 mg Oral Daily    escitalopram  20 mg Oral Daily    metFORMIN  500 mg Oral Daily with breakfast    metoprolol tartrate  25 mg Oral Daily    Vitamin D  2,000 Units Oral Daily    [Held by provider] rivaroxaban  20 mg Oral Q24H    sodium chloride flush  5-40 mL IntraVENous 2 times per day       IV Infusions (if any):   sodium chloride         PHYSICAL EXAM:     /60   Pulse 85   Temp 97.4 °F (36.3 °C) (Oral)   Resp 21   Ht 1.803 m (5' 11\")   Wt 64.7 kg (142 lb 9.6 oz)   SpO2 91%   BMI 19.89 kg/m²     CONST:  Well developed, appears stated age. Awake, alert and no apparent distress.   HEENT:   Head- Normocephalic  Eyes- Conjunctivae pink, no  icterus  Throat- Oral mucosa moist  Neck-  No stridor, + jugular venous distention. No carotid bruit.    CHEST: Chest  non-tender to palpation. No accessory muscle use  RESPIRATORY: Lung sounds - Few rhonchi  CARDIOVASCULAR:     Heart Inspection-Pacer site OK on Left  Heart Palpation- No thrills.   Heart Ausculation- Irregular rate; 2/6 systolic murmur. No s3 or rub   EXT: No lower extremity edema. Distal pulses palpable, no cyanosis   ABDOMEN: Soft,  Bowel sounds present.   MS: N/A   : Deferred  RECTAL: Deferred  SKIN: Warm and dry   NEURO / PSYCH: Oriented to person, place; Good mood and affect.        IMPRESSION / RECOMMENDATIONS:    Closed left hip fracture, initial encounter (HCC) - due to fall - s/p surgery    Irregular rhythm - Underlying A fib with intermittent Ventricular paced  - Interrogated pacer    PAF - On Xarelto, held for surgery    CMP - GDMT as his BP/renal Fn tolerates - Change Metoprolol to Succinate.    CAD  - s/p CABG x1 with the in situ left internal thoracic artery to the LAD,-  11/82011    S/p TV repair with #28 classic Charles ring 11/8/2011    VHD - Moderate to severe TR, moderate MR, AR    S/p Pacemaker - 2018  - [employer] - Interrogated Feb 2024 - 6.8 yrs on battery, 99% .    Hx of  constrictive pericarditis s/p pericardiectomy - 2011    Chronic systolic HF - Diuretics as needed.     CKD stage III - Monitor renal Fn              Above d/w him  - No family at bed side        Electronically signed by Julio César Regalado MD on 6/21/2024 at 3:04 PM  [facility]

## 2024-06-21 NOTE — PROGRESS NOTES
Physical Therapy  Physical Therapy Initial Evaluation/Plan of Care    Room #:  0623/0623-02  Patient Name: Lonnie Pena  YOB: 1935  MRN: [id number]    Date of Service: 6/21/2024     Tentative placement recommendation: Subacute Rehab  Equipment recommendation: To be determined      Evaluating Physical Therapist: Bucky Hwang, PT, DPT #[id number]      Specific Provider Orders/Date/Referring Provider :     06/19/24 1845    PT evaluation and treat  Start:  06/19/24 1845,   End:  06/19/24 1845,   ONE TIME,   Standing Count:  1 Occurrences,   R       Order went unreviewed at transfer on Wed Jun 19, 2024  8:04 PM  Alok Minaya MD Acknowledge New    Admitting Diagnosis:   Closed fracture of left hip, initial encounter ([facility]) [S72.002A]  Closed left hip fracture, initial encounter ([facility]) [S72.002A]  Hip fracture, left, closed, initial encounter ([facility]) [S72.002A]      Surgery: L hip hemiarthroplasty on 6/20/24  Visit Diagnoses         Codes    Closed fracture of left hip, initial encounter ([facility])    -  Primary S72.002A    S/p left hip fracture     Z87.81            Patient Active Problem List   Diagnosis    Stroke-like symptoms    Expressive aphasia    Hip fracture, left, closed, initial encounter ([facility])    Closed left hip fracture, initial encounter ([facility])    Fall        ASSESSMENT of Current Deficits Patient exhibits decreased strength, balance, and endurance impairing functional mobility, transfers, gait , gait distance, and tolerance to activity. Pt pleasantly confused during session but agreeable to therapy. Pt required 1 step commands and was able to follow ~50% of the time. Pt agreeable to seated exercises sitting EOB and in chair with AAROM needed for LLE.        PHYSICAL THERAPY  PLAN OF CARE       Physical therapy plan of care is established based on physician order,  patient diagnosis and clinical assessment    Current Treatment Recommendations:    -Bed Mobility: Lower and upper extremity exercises, and  bilateral     Thrombocytopenia (HCC)      Past Surgical History:   Procedure Laterality Date    CORONARY ARTERY BYPASS GRAFT  2011    HEMORRHOID SURGERY      HIP SURGERY Left 6/20/2024    LEFT HIP HEMIARTHROPLASTY performed by Lonnie Jennings DO at [facility] OR    PERICARDIUM SURGERY         SUBJECTIVE:    Precautions: Up with assistance, falls, alarm, and posterior hip precautions, WBAT LLE, Dementia, Afib      Social history: Patient lives at [facility]    Equipment owned: Equipment at Nursing Home    AM-PAC Basic Mobility       AM-PAC Basic Mobility - Inpatient   How much help is needed turning from your back to your side while in a flat bed without using bedrails?: A Little  How much help is needed moving from lying on your back to sitting on the side of a flat bed without using bedrails?: A Little  How much help is needed moving to and from a bed to a chair?: A Lot  How much help is needed standing up from a chair using your arms?: A Lot  How much help is needed walking in hospital room?: Total  How much help is needed climbing 3-5 steps with a railing?: Total  AM-PAC Inpatient Mobility Raw Score : 12  AM-PAC Inpatient T-Scale Score : 35.33  Mobility Inpatient CMS 0-100% Score: 68.66  Mobility Inpatient CMS G-Code Modifier : CL    Nursing cleared patient for PT evaluation. The admitting diagnosis and active problem list as listed above have been reviewed prior to the initiation of this evaluation.    OBJECTIVE:   Initial Evaluation  Date: 6/21/2024 Treatment Date:     Short Term/ Long Term   Goals   Was pt agreeable to Eval/treatment? Yes  To be met in 2 days   Pain level   0/10       Bed Mobility  Using rails and head of bed elevated:     Rolling: Minimal assist of 1    Supine to sit: Minimal assist of 1    Sit to supine: Minimal assist of 1    Scooting: Minimal assist of 1    Rolling: Supervision     Supine to sit: Supervision     Sit to supine: Supervision     Scooting: Supervision      Transfers Sit to  stand: Moderate assist of 1   Sit to stand: Supervision     Ambulation     3-5 feet using  wheeled walker with Maximal assist of 1   for walker control, walker approximation, balance, upright, weight shift, and safety    > 25 feet using  wheeled walker with Minimal assist of 1    ROM Within functional limits    Increase range of motion 10% of affected joints    Strength BUE:  refer to OT eval  RLE:  3+/5  LLE:   2+/5  Increase strength in affected mm groups by 1/3 grade   Balance Sitting EOB:  fair   Dynamic Standing:  fair- with WW  Sitting EOB:  fair+  Dynamic Standing: fair with WW     Patient is Alert & Oriented x person and follows one step directions very confused   Sensation:  Patient  denies numbness/tingling   Edema:  no   Endurance: fair -    Vitals: room air   Blood Pressure at rest  Blood Pressure during session    Heart Rate at rest  Heart Rate during session    SPO2 at rest %  SPO2 during session %     Patient education  Patient educated on role of Physical Therapy, risks of immobility, safety and plan of care, energy conservation,  importance of mobility while in hospital , ankle pumps, quad set and glut set for edema control, blood clot prevention, importance and purpose of adaptive device and adjusted to proper height for the patient., safety , and positioning for skin integrity and comfort     Patient response to education:   Pt verbalized understanding Pt demonstrated skill Pt requires further education in this area   Yes Partial Yes      Treatment:  Patient practiced and was instructed/facilitated in the following treatment: Patient Sat edge of bed 10 minutes with Minimal assist of 1 to increase dynamic sitting balance and activity tolerance. Pt performed bed mobility, transferred to chair, seated exercises.      Therapeutic Exercises:  ankle pumps, heel raises, hip abduction/adduction, long arc quad, and seated marching  x 12-15 reps with AAROM for LLE.       At end of session, patient in chair

## 2024-06-22 LAB
EKG ATRIAL RATE: 62 BPM
EKG Q-T INTERVAL: 446 MS
EKG QRS DURATION: 98 MS
EKG QTC CALCULATION (BAZETT): 452 MS
EKG R AXIS: -18 DEGREES
EKG T AXIS: -94 DEGREES
EKG VENTRICULAR RATE: 62 BPM
GLUCOSE BLD-MCNC: 272 MG/DL (ref 74–99)

## 2024-06-22 PROCEDURE — 97530 THERAPEUTIC ACTIVITIES: CPT

## 2024-06-22 PROCEDURE — 6370000000 HC RX 637 (ALT 250 FOR IP)

## 2024-06-22 PROCEDURE — 2580000003 HC RX 258: Performed by: INTERNAL MEDICINE

## 2024-06-22 PROCEDURE — 6370000000 HC RX 637 (ALT 250 FOR IP): Performed by: INTERNAL MEDICINE

## 2024-06-22 PROCEDURE — 93010 ELECTROCARDIOGRAM REPORT: CPT | Performed by: INTERNAL MEDICINE

## 2024-06-22 PROCEDURE — 97110 THERAPEUTIC EXERCISES: CPT

## 2024-06-22 PROCEDURE — 82962 GLUCOSE BLOOD TEST: CPT

## 2024-06-22 PROCEDURE — 2060000000 HC ICU INTERMEDIATE R&B

## 2024-06-22 PROCEDURE — 99232 SBSQ HOSP IP/OBS MODERATE 35: CPT | Performed by: INTERNAL MEDICINE

## 2024-06-22 RX ADMIN — Medication 10 ML: at 20:06

## 2024-06-22 RX ADMIN — OXYCODONE HYDROCHLORIDE 5 MG: 5 TABLET ORAL at 04:17

## 2024-06-22 RX ADMIN — METFORMIN HYDROCHLORIDE 500 MG: 500 TABLET ORAL at 08:54

## 2024-06-22 RX ADMIN — METOPROLOL SUCCINATE 25 MG: 25 TABLET, EXTENDED RELEASE ORAL at 08:54

## 2024-06-22 RX ADMIN — ACETAMINOPHEN 650 MG: 325 TABLET ORAL at 11:13

## 2024-06-22 RX ADMIN — ESCITALOPRAM OXALATE 20 MG: 10 TABLET ORAL at 08:54

## 2024-06-22 RX ADMIN — OXYCODONE HYDROCHLORIDE 5 MG: 5 TABLET ORAL at 08:54

## 2024-06-22 RX ADMIN — Medication 2000 UNITS: at 08:54

## 2024-06-22 RX ADMIN — TAMSULOSIN HYDROCHLORIDE 0.4 MG: 0.4 CAPSULE ORAL at 08:53

## 2024-06-22 RX ADMIN — RIVAROXABAN 20 MG: 20 TABLET, FILM COATED ORAL at 17:38

## 2024-06-22 RX ADMIN — ACETAMINOPHEN 650 MG: 325 TABLET ORAL at 20:03

## 2024-06-22 RX ADMIN — Medication 10 ML: at 08:55

## 2024-06-22 ASSESSMENT — PAIN DESCRIPTION - LOCATION
LOCATION: HIP;LEG
LOCATION: HIP;LEG
LOCATION: HIP

## 2024-06-22 ASSESSMENT — PAIN SCALES - GENERAL
PAINLEVEL_OUTOF10: 10
PAINLEVEL_OUTOF10: 5
PAINLEVEL_OUTOF10: 7
PAINLEVEL_OUTOF10: 10
PAINLEVEL_OUTOF10: 8
PAINLEVEL_OUTOF10: 5

## 2024-06-22 ASSESSMENT — PAIN SCALES - WONG BAKER
WONGBAKER_NUMERICALRESPONSE: HURTS LITTLE MORE
WONGBAKER_NUMERICALRESPONSE: HURTS LITTLE MORE

## 2024-06-22 ASSESSMENT — PAIN DESCRIPTION - ORIENTATION: ORIENTATION: LEFT

## 2024-06-22 NOTE — PROGRESS NOTES
Department of Orthopedic Surgery   Progress Note    Patient seen and examined. Pain controlled. No new complaints.  Denies chest pain, shortness of breath, calf pain, dizziness/lightheadedness, fevers or chills.     VITALS:  BP (!) 108/52   Pulse 72   Temp 99.2 °F (37.3 °C) (Oral)   Resp 20   Ht 1.803 m (5' 11\")   Wt 114 kg (251 lb 6.4 oz)   SpO2 95%   BMI 35.06 kg/m²     GENERAL: In bed, comfortable  MUSCULOSKELETAL:   left lower extremity:  Dressing C/D/I  Compartments soft and compressible, calf non-tender  Palpable dorsalis pedis and posterior tibialis pulse, brisk cap refill to toes, foot warm and perfused  Sensation intact to light touch in sural/deep peroneal/superficial peroneal/saphenous/posterior tibial nerve distributions to foot/ankle.  Demonstrates active ankle plantar/dorsiflexion/great toe extension    CBC:   Lab Results   Component Value Date/Time    WBC 11.5 06/20/2024 04:36 AM    HGB 12.9 06/20/2024 04:36 AM    HCT 38.7 06/20/2024 04:36 AM     06/20/2024 04:36 AM       ASSESSMENT  Left hip hemiarthroplasty 6/20    PLAN    Weightbearing as tolerated left lower extremity, posterior hip precautions  Postop Ancef for surgical prophylaxis completed  DVT prophylaxis.  Okay to restart Xarelto today  PT/OT as amenable  Pain control with moderate LV/p.o. per admitting team  DC: Pending all team reconciliation's.  Okay to discharge from orthopedic standpoint.  Will continue to monitor patient peripherally during hospital stay.  Please call if any question or concern    Electronically signed by Richard Nur DO on 6/22/2024 at 8:09 AM

## 2024-06-22 NOTE — PROGRESS NOTES
Physical Therapy  Physical Therapy Treatment Note/Plan of Care    Room #:  0621/0621-01  Patient Name: Lonnie Pena  YOB: 1935  MRN: [id number]    Date of Service: 6/22/2024     Tentative placement recommendation: Subacute Rehab  Equipment recommendation: To be determined      Evaluating Physical Therapist: Bucky Hwang PT, DPT #[id number]      Specific Provider Orders/Date/Referring Provider :     06/19/24 1845    PT evaluation and treat  Start:  06/19/24 1845,   End:  06/19/24 1845,   ONE TIME,   Standing Count:  1 Occurrences,   R       Order went unreviewed at transfer on Wed Jun 19, 2024  8:04 PM  Alok Minaya MD Acknowledge New    Admitting Diagnosis:   Closed fracture of left hip, initial encounter ([facility]) [S72.002A]  Closed left hip fracture, initial encounter ([facility]) [S72.002A]  Hip fracture, left, closed, initial encounter ([facility]) [S72.002A]      Surgery: L hip hemiarthroplasty on 6/20/24  Visit Diagnoses         Codes    Closed fracture of left hip, initial encounter ([facility])    -  Primary S72.002A    S/p left hip fracture     Z87.81            Patient Active Problem List   Diagnosis    Stroke-like symptoms    Expressive aphasia    Hip fracture, left, closed, initial encounter ([facility])    Closed left hip fracture, initial encounter ([facility])    Fall        ASSESSMENT of Current Deficits Patient exhibits decreased strength, balance, and endurance impairing functional mobility, transfers, gait , gait distance, and tolerance to activity. Pt needing moderate assist for supine<>sit. Pt did not want to try and stand up due to pain. Pt educated on the importance of adherence to hip precautions due to his tendency to internally rotate his left lower extremity in supine and sitting.       PHYSICAL THERAPY  PLAN OF CARE       Physical therapy plan of care is established based on physician order,  patient diagnosis and clinical assessment    Current Treatment Recommendations:    -Bed Mobility: Lower and upper

## 2024-06-22 NOTE — PLAN OF CARE
Problem: Discharge Planning  Goal: Discharge to home or other facility with appropriate resources  Outcome: Progressing     Problem: Pain  Goal: Verbalizes/displays adequate comfort level or baseline comfort level  Outcome: Progressing  Flowsheets (Taken 6/21/2024 2248)  Verbalizes/displays adequate comfort level or baseline comfort level: Encourage patient to monitor pain and request assistance     Problem: Skin/Tissue Integrity  Goal: Absence of new skin breakdown  Description: 1.  Monitor for areas of redness and/or skin breakdown  2.  Assess vascular access sites hourly  3.  Every 4-6 hours minimum:  Change oxygen saturation probe site  4.  Every 4-6 hours:  If on nasal continuous positive airway pressure, respiratory therapy assess nares and determine need for appliance change or resting period.  Outcome: Progressing     Problem: Safety - Adult  Goal: Free from fall injury  Outcome: Progressing

## 2024-06-22 NOTE — CARE COORDINATION
SOCIAL WORK / DISCHARGE PLANNING:  [facility], [facility] [phone], Fax [phone], has been available today for pt to admit after 3pm if medically stable. ABILIO will need signed by physician. HENs form will need completed prior to dc, has been initiated.           Electronically signed by ANGIE Perez on 6/22/2024 at 9:19 AM

## 2024-06-22 NOTE — PROGRESS NOTES
INPATIENT CARDIOLOGY FOLLOW-UP    Name: Lonnie Pena    Age: 88 y.o.    Date of Admission: 6/19/2024  3:52 PM    Date of Service: 6/22/2024    Chief Complaint: Follow-up for bradycardia    Interim History:  No new overnight cardiac complaints.  Patient underwent left hip ORIF on Thursday without any perioperative cardiac events.  Currently with no complaints of CP, SOB, palpitations, dizziness, or lightheadedness.  Atrial fibrillation on telemetry.    Review of Systems:   Cardiac: As per HPI  General: No fever, chills  Pulmonary: As per HPI  HEENT: No visual disturbances, difficult swallowing  GI: No nausea, vomiting  Endocrine: No thyroid disease or DM  Musculoskeletal: BAZAN x 4, no focal motor deficits  Skin: Intact, no rashes  Neuro/Psych: No headache or seizures    Problem List:  Patient Active Problem List   Diagnosis    Stroke-like symptoms    Expressive aphasia    Hip fracture, left, closed, initial encounter ([facility])    Closed left hip fracture, initial encounter ([facility])    Fall       Allergies:  Allergies   Allergen Reactions    Adhesive Tape     Augmentin [Amoxicillin-Pot Clavulanate] Rash       Current Medications:  Current Facility-Administered Medications   Medication Dose Route Frequency Provider Last Rate Last Admin    rivaroxaban (XARELTO) tablet 20 mg  20 mg Oral Dinner Alok Minaya MD        metoprolol succinate (TOPROL XL) extended release tablet 25 mg  25 mg Oral Daily Julio César Regalado MD   25 mg at 06/22/24 0854    tamsulosin (FLOMAX) capsule 0.4 mg  0.4 mg Oral Daily Alok Minaya MD   0.4 mg at 06/22/24 0853    escitalopram (LEXAPRO) tablet 20 mg  20 mg Oral Daily Alok Minaya MD   20 mg at 06/22/24 0854    metFORMIN (GLUCOPHAGE) tablet 500 mg  500 mg Oral Daily with breakfast Alok Minaya MD   500 mg at 06/22/24 0854    Vitamin D (CHOLECALCIFEROL) tablet 2,000 Units  2,000 Units Oral Daily Alok Minaya MD   2,000 Units at 06/22/24 0854    sodium chloride flush 0.9 % injection  atraumatic  Eyes: EOMI, no conjunctival erythema  ENMT: No pharyngeal erythema, MMM, no rhinorrhea  Neck: Supple, no elevated JVP, no carotid bruits  Lungs: Clear to auscultation bilaterally. No wheezes, rales, or rhonchi.  Cardiac: Irregularly irregular rate and rhythm, +S1S2, no murmurs apparent  Abdomen: Soft, nontender, +bowel sounds  Extremities: Moves all extremities x 4, no lower extremity edema  Neurologic: No focal motor deficits apparent, normal mood and affect  Peripheral Pulses: Intact posterior tibial pulses bilaterally    Intake/Output:    Intake/Output Summary (Last 24 hours) at 6/22/2024 1456  Last data filed at 6/22/2024 0522  Gross per 24 hour   Intake --   Output 550 ml   Net -550 ml     No intake/output data recorded.    Laboratory Tests:  Recent Labs     06/19/24  1625      K 4.2      CO2 22   BUN 25*   CREATININE 1.3*   GLUCOSE 100*   CALCIUM 9.3     Lab Results   Component Value Date/Time    MG 2.4 03/19/2019 06:21 AM     No results for input(s): \"ALKPHOS\", \"ALT\", \"AST\", \"PROT\", \"BILITOT\", \"BILIDIR\", \"LABALBU\" in the last 72 hours.  Recent Labs     06/19/24  1625 06/20/24  0436   WBC 15.4* 11.5   RBC 4.47 4.23   HGB 14.0 12.9   HCT 42.4 38.7   MCV 94.9 91.5   MCH 31.3 30.5   MCHC 33.0 33.3   RDW 13.2 13.3    196   MPV 9.8 9.9     Lab Results   Component Value Date    TROPONINI <0.01 03/17/2019    TROPONINI <0.01 09/14/2017    TROPONINI <0.01 07/29/2017     Lab Results   Component Value Date    INR 1.4 06/19/2024    INR 2.5 09/14/2017    INR 1.0 02/20/2015    PROTIME 15.2 (H) 06/19/2024    PROTIME 27.9 (H) 09/14/2017    PROTIME 11.7 02/20/2015     Lab Results   Component Value Date    TSH 1.940 07/03/2017     Lab Results   Component Value Date    LABA1C 6.0 (H) 03/18/2019     No results found for: \"EAG\"  Lab Results   Component Value Date    CHOL 118 07/03/2017    CHOL 112 03/09/2017    CHOL 171 11/03/2016     Lab Results   Component Value Date    TRIG 64 07/03/2017    TRIG  114 03/09/2017    TRIG 120 11/03/2016     Lab Results   Component Value Date    HDL 58 03/18/2019    HDL 64 07/03/2017    HDL 58 03/09/2017     No components found for: \"LDLCALC\", \"LDLCHOLESTEROL\"  Lab Results   Component Value Date    VLDL 9 03/18/2019    VLDL 13 07/03/2017    VLDL 23 03/09/2017     No results found for: \"CHOLHDLRATIO\"    Cardiac Tests:  ECG: Atrial fibrillation with ventricular paced rhythm with occasional fusion complexes, abnormal EKG.    Telemetry findings reviewed: Atrial fibrillation heart rate in the 60s    Vitals and labs were reviewed: As above, blood pressure 110/78, heart rate 80 sats 94% on room, air afebrile    Labs-no labs for today.  CBC and normal, creatinine 1.3 just a very AS it was normal Versaport or Neurontin AS yeah the GRA E scribed is AS normal laminectomy    TTE 5/18/2023 - [facility]  CONCLUSIONS:   - Exam indication: CHF   - The abnormal regional wall motion pattern in conjunction with normal regional   wall thickness is consistent with a RV paced abnormal conduction delay.   - The left ventricle is normal in size. Left ventricular systolic function is   mildly decreased. EF = 44 ± 5% (2D 4-ch.) Left ventricular diastolic function was   not evaluated due to pacing.   - The right ventricle is normal in size. Right ventricular systolic function is   mildly decreased.   - The left atrial cavity is severely dilated.   - The right atrial cavity is dilated.   - The visualized aorta is borderline dilated with a maximal dimension of 3.9 cm.   - There is moderate (2+) mitral valve regurgitation. Regurgitant orifice area   (PISA) is 0.18 cm².   - Post tricuspid valve repair. Charles-Baptiste Tricuspid Ring (size #28). There   is at least moderate (2+ - 3+) tricuspid valve regurgitation. The peak gradient   is 5 mmHg and the mean gradient is 2 mmHg. Prior peak/mean gradients were 3/1mmHg.   Today's gradients obtained at HR of 60 bpm.   - There is moderate (2+) aortic valve regurgitation.

## 2024-06-23 LAB
ALBUMIN SERPL-MCNC: 3.1 G/DL (ref 3.5–5.2)
ALP SERPL-CCNC: 53 U/L (ref 40–129)
ALT SERPL-CCNC: 7 U/L (ref 0–40)
ANION GAP SERPL CALCULATED.3IONS-SCNC: 10 MMOL/L (ref 7–16)
AST SERPL-CCNC: 20 U/L (ref 0–39)
BILIRUB SERPL-MCNC: 0.8 MG/DL (ref 0–1.2)
BUN SERPL-MCNC: 44 MG/DL (ref 6–23)
CALCIUM SERPL-MCNC: 8.1 MG/DL (ref 8.6–10.2)
CHLORIDE SERPL-SCNC: 96 MMOL/L (ref 98–107)
CO2 SERPL-SCNC: 20 MMOL/L (ref 22–29)
CREAT SERPL-MCNC: 1.6 MG/DL (ref 0.7–1.2)
ERYTHROCYTE [DISTWIDTH] IN BLOOD BY AUTOMATED COUNT: 13.1 % (ref 11.5–15)
GFR, ESTIMATED: 42 ML/MIN/1.73M2
GLUCOSE SERPL-MCNC: 272 MG/DL (ref 74–99)
HCT VFR BLD AUTO: 20.3 % (ref 37–54)
HCT VFR BLD AUTO: 27.1 % (ref 37–54)
HGB BLD-MCNC: 6.9 G/DL (ref 12.5–16.5)
HGB BLD-MCNC: 8.8 G/DL (ref 12.5–16.5)
LACTATE BLDV-SCNC: 1.9 MMOL/L (ref 0.5–2.2)
MCH RBC QN AUTO: 30.7 PG (ref 26–35)
MCHC RBC AUTO-ENTMCNC: 32.5 G/DL (ref 32–34.5)
MCV RBC AUTO: 94.4 FL (ref 80–99.9)
PLATELET # BLD AUTO: 166 K/UL (ref 130–450)
PMV BLD AUTO: 10.8 FL (ref 7–12)
POTASSIUM SERPL-SCNC: 4 MMOL/L (ref 3.5–5)
PROT SERPL-MCNC: 5.8 G/DL (ref 6.4–8.3)
RBC # BLD AUTO: 2.87 M/UL (ref 3.8–5.8)
SODIUM SERPL-SCNC: 126 MMOL/L (ref 132–146)
WBC OTHER # BLD: 8.3 K/UL (ref 4.5–11.5)

## 2024-06-23 PROCEDURE — 85014 HEMATOCRIT: CPT

## 2024-06-23 PROCEDURE — P9047 ALBUMIN (HUMAN), 25%, 50ML: HCPCS | Performed by: INTERNAL MEDICINE

## 2024-06-23 PROCEDURE — 80053 COMPREHEN METABOLIC PANEL: CPT

## 2024-06-23 PROCEDURE — 97530 THERAPEUTIC ACTIVITIES: CPT

## 2024-06-23 PROCEDURE — 36415 COLL VENOUS BLD VENIPUNCTURE: CPT

## 2024-06-23 PROCEDURE — 97530 THERAPEUTIC ACTIVITIES: CPT | Performed by: PHYSICAL THERAPIST

## 2024-06-23 PROCEDURE — 85018 HEMOGLOBIN: CPT

## 2024-06-23 PROCEDURE — 6370000000 HC RX 637 (ALT 250 FOR IP): Performed by: INTERNAL MEDICINE

## 2024-06-23 PROCEDURE — 83605 ASSAY OF LACTIC ACID: CPT

## 2024-06-23 PROCEDURE — 97110 THERAPEUTIC EXERCISES: CPT

## 2024-06-23 PROCEDURE — 6360000002 HC RX W HCPCS: Performed by: INTERNAL MEDICINE

## 2024-06-23 PROCEDURE — 2580000003 HC RX 258: Performed by: INTERNAL MEDICINE

## 2024-06-23 PROCEDURE — 85027 COMPLETE CBC AUTOMATED: CPT

## 2024-06-23 PROCEDURE — 2060000000 HC ICU INTERMEDIATE R&B

## 2024-06-23 RX ORDER — SODIUM CHLORIDE 9 MG/ML
INJECTION, SOLUTION INTRAVENOUS
Status: DISCONTINUED | OUTPATIENT
Start: 2024-06-23 | End: 2024-06-23

## 2024-06-23 RX ORDER — SODIUM CHLORIDE 9 MG/ML
INJECTION, SOLUTION INTRAVENOUS CONTINUOUS
Status: ACTIVE | OUTPATIENT
Start: 2024-06-23 | End: 2024-06-23

## 2024-06-23 RX ORDER — SODIUM CHLORIDE, SODIUM LACTATE, POTASSIUM CHLORIDE, AND CALCIUM CHLORIDE .6; .31; .03; .02 G/100ML; G/100ML; G/100ML; G/100ML
500 INJECTION, SOLUTION INTRAVENOUS ONCE
Status: COMPLETED | OUTPATIENT
Start: 2024-06-23 | End: 2024-06-23

## 2024-06-23 RX ORDER — CLOBETASOL PROPIONATE 0.5 MG/G
CREAM TOPICAL 2 TIMES DAILY
Status: DISCONTINUED | OUTPATIENT
Start: 2024-06-23 | End: 2024-06-27 | Stop reason: HOSPADM

## 2024-06-23 RX ORDER — ALBUMIN (HUMAN) 12.5 G/50ML
50 SOLUTION INTRAVENOUS ONCE
Status: COMPLETED | OUTPATIENT
Start: 2024-06-23 | End: 2024-06-23

## 2024-06-23 RX ORDER — 0.9 % SODIUM CHLORIDE 0.9 %
1000 INTRAVENOUS SOLUTION INTRAVENOUS ONCE
Status: COMPLETED | OUTPATIENT
Start: 2024-06-23 | End: 2024-06-23

## 2024-06-23 RX ADMIN — Medication 10 ML: at 07:57

## 2024-06-23 RX ADMIN — ACETAMINOPHEN 650 MG: 325 TABLET ORAL at 07:52

## 2024-06-23 RX ADMIN — ESCITALOPRAM OXALATE 20 MG: 10 TABLET ORAL at 07:51

## 2024-06-23 RX ADMIN — SODIUM CHLORIDE, POTASSIUM CHLORIDE, SODIUM LACTATE AND CALCIUM CHLORIDE 500 ML: 600; 310; 30; 20 INJECTION, SOLUTION INTRAVENOUS at 18:54

## 2024-06-23 RX ADMIN — TAMSULOSIN HYDROCHLORIDE 0.4 MG: 0.4 CAPSULE ORAL at 07:52

## 2024-06-23 RX ADMIN — METFORMIN HYDROCHLORIDE 500 MG: 500 TABLET ORAL at 07:52

## 2024-06-23 RX ADMIN — Medication 2000 UNITS: at 07:52

## 2024-06-23 RX ADMIN — ACETAMINOPHEN 650 MG: 325 TABLET ORAL at 16:05

## 2024-06-23 RX ADMIN — ALBUMIN (HUMAN) 50 G: 0.25 INJECTION, SOLUTION INTRAVENOUS at 11:46

## 2024-06-23 RX ADMIN — SODIUM CHLORIDE: 9 INJECTION, SOLUTION INTRAVENOUS at 13:19

## 2024-06-23 RX ADMIN — CLOBETASOL PROPIONATE CREAM USP, 0.05%: 0.5 CREAM TOPICAL at 17:40

## 2024-06-23 RX ADMIN — METOPROLOL SUCCINATE 25 MG: 25 TABLET, EXTENDED RELEASE ORAL at 07:52

## 2024-06-23 RX ADMIN — POLYETHYLENE GLYCOL 3350 17 G: 17 POWDER, FOR SOLUTION ORAL at 07:52

## 2024-06-23 RX ADMIN — RIVAROXABAN 20 MG: 20 TABLET, FILM COATED ORAL at 17:39

## 2024-06-23 RX ADMIN — SODIUM CHLORIDE 1000 ML: 9 INJECTION, SOLUTION INTRAVENOUS at 09:41

## 2024-06-23 ASSESSMENT — PAIN SCALES - GENERAL
PAINLEVEL_OUTOF10: 5
PAINLEVEL_OUTOF10: 4

## 2024-06-23 ASSESSMENT — PAIN SCALES - WONG BAKER: WONGBAKER_NUMERICALRESPONSE: HURTS LITTLE MORE

## 2024-06-23 NOTE — PROGRESS NOTES
Primary Care Physician: Alok Minaya MD   Admitting Physician:  Alok Minaya MD  Admission date and time: 6/19/2024  3:52 PM    Room:  [address] [place] [address]    Admitting diagnosis:   Left hip fracture  Atrial fibrillation  Coronary artery disease  Bullous pemphigoid  Depression  Dementia  Hypertension  Hyperlipidemia  Benign prostatic hypertrophy  History of fall  Acute pain secondary to left hip fracture  Type 2 diabetes  History of DVT      Patient Name: Lonnie Pena  MRN: [id number]    Date of Service: 6/23/2024     Subjective:  Lonnie is a 88 y.o. male who was seen and examined today,6/23/2024, at the bedside.  Patient has advanced dementia he is Status post left hip arthroplasty , offers no complaints currently laying down comfortably was evaluated by cardiology for bradycardia pacemaker was interrogated they are not finding any issues at this time.     6/23/24  Got a call from nursing staff today patient had a low blood pressure of 94/43 this morning was feeling little dizzy I advised to check his hemoglobin which had dropped down quite a bit he is a status post left hip arthroplasty according to the nursing staff the incision was soaked with blood orthopedic has been  informed that advised the pressure bandage I ordered a hemoglobin to be done every 12 hours and also gave him a liter of fluid    No family present during my examination.    Review of System:   Constitutional:   Denies fever or chills, weight loss or gain, fatigue or malaise.  HEENT:   Denies ear pain, sore throat, sinus or eye problems.  Cardiovascular:   Denies any chest pain, irregular heartbeats, or palpitations.   Respiratory:   Denies shortness of breath, coughing, sputum production, hemoptysis, or wheezing.  Gastrointestinal:   Denies nausea, vomiting, diarrhea, or constipation.  Denies any abdominal pain.  Genitourinary:    Denies any urgency, frequency, hematuria. Voiding  without difficulty.  Extremities:   Positive for Minimal left hip  pain,Status post left hip arthroplasty  Neurology:    Denies any headache or focal neurological deficits, Denies generalized weakness or memory difficulty.   Psch:   Denies being anxious or depressed.  Musculoskeletal:    Denies  myalgias, joint complaints or back pain.   Integumentary:   Denies any rashes, ulcers, or excoriations.  Denies bruising.  Hematologic/Lymphatic:  Denies bruising or bleeding.    Physical Exam:  I/O this shift:  In: 318.6 [P.O.:120; IV Piggyback:198.6]  Out: 300 [Urine:300]    Intake/Output Summary (Last 24 hours) at 6/23/2024 1243  Last data filed at 6/23/2024 1237  Gross per 24 hour   Intake 318.59 ml   Output 1100 ml   Net -781.41 ml     I/O last 3 completed shifts:  In: -   Out: 1350 [Urine:1350]  Patient Vitals for the past 96 hrs (Last 3 readings):   Weight   06/22/24 0509 114 kg (251 lb 6.4 oz)   06/21/24 0458 64.7 kg (142 lb 9.6 oz)   06/20/24 0550 66.2 kg (145 lb 15.1 oz)       Vital Signs:   Blood pressure (!) 88/45, pulse 60, temperature 98.1 °F (36.7 °C), resp. rate 14, height 1.803 m (5' 11\"), weight 114 kg (251 lb 6.4 oz), SpO2 98 %.    Medication:  Scheduled Meds:   albumin human 25%  50 g IntraVENous Once    rivaroxaban  20 mg Oral Dinner    metoprolol succinate  25 mg Oral Daily    tamsulosin  0.4 mg Oral Daily    escitalopram  20 mg Oral Daily    metFORMIN  500 mg Oral Daily with breakfast    Vitamin D  2,000 Units Oral Daily    sodium chloride flush  5-40 mL IntraVENous 2 times per day     Continuous Infusions:   sodium chloride       Patient is awake alert oriented hemodynamically stable  HEENT unremarkable  Neck negative JVD negative node negative thyromegaly  Lungs clear to auscultation and percussion  Cardiac examination S1-S2 normal irregularly irregular  Abdomen soft plus bowel sounds negative hepatosplenomegaly  Extremity pain on movement of the left.  S/p total hip arthroplasty surgical site saturated with blood pressure dressing applied  Neuro nonfocal cranial

## 2024-06-23 NOTE — PROGRESS NOTES
Physical Therapy  Physical Therapy Treatment Note/Plan of Care    Room #:  0621/0621-01  Patient Name: Lonnie Pena  YOB: 1935  MRN: [id number]    Date of Service: 6/23/2024     Tentative placement recommendation: Subacute Rehab  Equipment recommendation: To be determined      Evaluating Physical Therapist: Bucky Hwang, PT, DPT #[id number]      Specific Provider Orders/Date/Referring Provider :     06/19/24 1845    PT evaluation and treat  Start:  06/19/24 1845,   End:  06/19/24 1845,   ONE TIME,   Standing Count:  1 Occurrences,   R       Order went unreviewed at transfer on Wed Jun 19, 2024  8:04 PM  Alok Minaya MD Acknowledge New    Admitting Diagnosis:   Closed fracture of left hip, initial encounter ([facility]) [S72.002A]  Closed left hip fracture, initial encounter ([facility]) [S72.002A]  Hip fracture, left, closed, initial encounter ([facility]) [S72.002A]      Surgery: L hip hemiarthroplasty on 6/20/24  Visit Diagnoses         Codes    Closed fracture of left hip, initial encounter ([facility])    -  Primary S72.002A    S/p left hip fracture     Z87.81            Patient Active Problem List   Diagnosis    Stroke-like symptoms    Expressive aphasia    Hip fracture, left, closed, initial encounter ([facility])    Closed left hip fracture, initial encounter ([facility])    Fall        ASSESSMENT of Current Deficits Patient exhibits decreased strength, balance, and endurance impairing functional mobility, transfers, gait , gait distance, and tolerance to activity. Patient up in chair, experiencing pain and fatigue today. Performed seated exercises AROM. Patient assisted with standing from chair, moderate assist, however declined stepping and requested back to sitting. Patient has tendency for L LE IR. Cues, verbal/tactile to correct. Patient would benefit from further therapy to address above deficits and further inc mobility.       PHYSICAL THERAPY  PLAN OF CARE       Physical therapy plan of care is established based on physician  treatment: Patient assisted with seated exercises in chair. Patient assisted to standing to wheeled walker, however declined taking steps and requested back to sitting in chair.         Therapeutic Exercises:  ankle pumps, heel raises, long arc quad, and seated marching,  x 10-15 reps    At end of session, patient in chair with son present and daughter present call light and phone within reach,  all lines and tubes intact, nursing notified.      Patient would benefit from continued skilled Physical Therapy to improve functional independence and quality of life.         Patient's/ family goals   rehab    Time in  1031  Time out 1101    Total Treatment Time  30 minutes        CPT codes:  Therapeutic activities (30038)   18 minutes  1 unit(s)  Therapeutic exercises (50521)   12 minutes  1 unit(s)    Milagro Vallecillo, [facility]  #[id number]

## 2024-06-23 NOTE — PLAN OF CARE
Problem: Discharge Planning  Goal: Discharge to home or other facility with appropriate resources  Outcome: Progressing  Flowsheets (Taken 6/22/2024 1900)  Discharge to home or other facility with appropriate resources: Identify barriers to discharge with patient and caregiver     Problem: Pain  Goal: Verbalizes/displays adequate comfort level or baseline comfort level  Outcome: Progressing     Problem: Skin/Tissue Integrity  Goal: Absence of new skin breakdown  Description: 1.  Monitor for areas of redness and/or skin breakdown  2.  Assess vascular access sites hourly  3.  Every 4-6 hours minimum:  Change oxygen saturation probe site  4.  Every 4-6 hours:  If on nasal continuous positive airway pressure, respiratory therapy assess nares and determine need for appliance change or resting period.  Outcome: Progressing     Problem: Safety - Adult  Goal: Free from fall injury  Outcome: Progressing     Problem: ABCDS Injury Assessment  Goal: Absence of physical injury  Outcome: Progressing

## 2024-06-23 NOTE — PROGRESS NOTES
Physical Therapy  Physical Therapy Treatment Note/Plan of Care    Room #:  0621/0621-01  Patient Name: Lonnie Pena  YOB: 1935  MRN: [id number]    Date of Service: 6/23/2024     Tentative placement recommendation: Subacute Rehab  Equipment recommendation: To be determined      Evaluating Physical Therapist: Bucky Hwang PT, DPT #[id number]      Specific Provider Orders/Date/Referring Provider :     06/19/24 1845    PT evaluation and treat  Start:  06/19/24 1845,   End:  06/19/24 1845,   ONE TIME,   Standing Count:  1 Occurrences,   R       Order went unreviewed at transfer on Wed Jun 19, 2024  8:04 PM  Alok Minaya MD Acknowledge New    Admitting Diagnosis:   Closed fracture of left hip, initial encounter ([facility]) [S72.002A]  Closed left hip fracture, initial encounter ([facility]) [S72.002A]  Hip fracture, left, closed, initial encounter ([facility]) [S72.002A]      Surgery: L hip hemiarthroplasty on 6/20/24  Visit Diagnoses         Codes    Closed fracture of left hip, initial encounter ([facility])    -  Primary S72.002A    S/p left hip fracture     Z87.81            Patient Active Problem List   Diagnosis    Stroke-like symptoms    Expressive aphasia    Hip fracture, left, closed, initial encounter ([facility])    Closed left hip fracture, initial encounter ([facility])    Fall        ASSESSMENT of Current Deficits Patient exhibits decreased strength, balance, and endurance impairing functional mobility, transfers, gait , gait distance, and tolerance to activity. Pt needing moderate assist for supine<>sit. Pt did not want to try and stand up due to pain. Pt educated on the importance of adherence to hip precautions due to his tendency to internally rotate his left lower extremity in supine and sitting.       PHYSICAL THERAPY  PLAN OF CARE       Physical therapy plan of care is established based on physician order,  patient diagnosis and clinical assessment    Current Treatment Recommendations:    -Bed Mobility: Lower and upper  Hypertension     Inguinal hernia, bilateral     Thrombocytopenia (HCC)      Past Surgical History:   Procedure Laterality Date    CORONARY ARTERY BYPASS GRAFT  2011    HEMORRHOID SURGERY      HIP SURGERY Left 6/20/2024    LEFT HIP HEMIARTHROPLASTY performed by Lonnie Jennings DO at [facility]    PERICARDIUM SURGERY         SUBJECTIVE:    Precautions: Up with assistance, falls, alarm, and posterior hip precautions, WBAT LLE, Dementia, Afib      Social history: Patient lives at [facility]    Equipment owned: Equipment at Nursing Home    AM-PAC Basic Mobility            Nursing cleared patient for PT treatment.      OBJECTIVE:   Initial Evaluation  Date: 6/21/2024 Treatment Date:  6/23/2024       Short Term/ Long Term   Goals   Was pt agreeable to Eval/treatment? Yes yes To be met in 2 days   Pain level   0/10   No number given  Left hip pain with movement    Bed Mobility  Using rails and head of bed elevated:     Rolling: Minimal assist of 1    Supine to sit: Minimal assist of 1    Sit to supine: Minimal assist of 1    Scooting: Minimal assist of 1   Using rails and head of bed elevated:     Rolling: Moderate assist of 1   Supine to sit: Moderate assist of 1   Sit to supine: Moderate assist of 1   Scooting: Moderate assist of 1    Rolling: Supervision     Supine to sit: Supervision     Sit to supine: Supervision     Scooting: Supervision      Transfers Sit to stand: Moderate assist of 1   Sit to stand: Supervision     Ambulation     3-5 feet using  wheeled walker with Maximal assist of 1   for walker control, walker approximation, balance, upright, weight shift, and safety Able to move R foot for 3 very short steps, moved L foot 1 step forward and 1 step back  Stood for ~10min attempting to ambulate   > 25 feet using  wheeled walker with Minimal assist of 1    ROM Within functional limits    Increase range of motion 10% of affected joints    Strength BUE:  refer to OT eval  RLE:  3+/5  LLE:   2+/5  Increase

## 2024-06-24 LAB
ANION GAP SERPL CALCULATED.3IONS-SCNC: 12 MMOL/L (ref 7–16)
ANION GAP SERPL CALCULATED.3IONS-SCNC: 8 MMOL/L (ref 7–16)
ANION GAP SERPL CALCULATED.3IONS-SCNC: 9 MMOL/L (ref 7–16)
ANION GAP SERPL CALCULATED.3IONS-SCNC: 9 MMOL/L (ref 7–16)
BUN SERPL-MCNC: 32 MG/DL (ref 6–23)
BUN SERPL-MCNC: 34 MG/DL (ref 6–23)
BUN SERPL-MCNC: 35 MG/DL (ref 6–23)
BUN SERPL-MCNC: 35 MG/DL (ref 6–23)
CALCIUM SERPL-MCNC: 8.2 MG/DL (ref 8.6–10.2)
CALCIUM SERPL-MCNC: 8.3 MG/DL (ref 8.6–10.2)
CALCIUM SERPL-MCNC: 8.3 MG/DL (ref 8.6–10.2)
CALCIUM SERPL-MCNC: 8.4 MG/DL (ref 8.6–10.2)
CHLORIDE SERPL-SCNC: 100 MMOL/L (ref 98–107)
CHLORIDE SERPL-SCNC: 101 MMOL/L (ref 98–107)
CHLORIDE SERPL-SCNC: 98 MMOL/L (ref 98–107)
CHLORIDE SERPL-SCNC: 99 MMOL/L (ref 98–107)
CO2 SERPL-SCNC: 23 MMOL/L (ref 22–29)
CO2 SERPL-SCNC: 24 MMOL/L (ref 22–29)
CO2 SERPL-SCNC: 25 MMOL/L (ref 22–29)
CO2 SERPL-SCNC: 25 MMOL/L (ref 22–29)
CREAT SERPL-MCNC: 1.4 MG/DL (ref 0.7–1.2)
CREAT SERPL-MCNC: 1.6 MG/DL (ref 0.7–1.2)
ERYTHROCYTE [DISTWIDTH] IN BLOOD BY AUTOMATED COUNT: 13.4 % (ref 11.5–15)
GFR, ESTIMATED: 42 ML/MIN/1.73M2
GFR, ESTIMATED: 47 ML/MIN/1.73M2
GFR, ESTIMATED: 48 ML/MIN/1.73M2
GFR, ESTIMATED: 48 ML/MIN/1.73M2
GLUCOSE SERPL-MCNC: 166 MG/DL (ref 74–99)
GLUCOSE SERPL-MCNC: 290 MG/DL (ref 74–99)
GLUCOSE SERPL-MCNC: 327 MG/DL (ref 74–99)
GLUCOSE SERPL-MCNC: 338 MG/DL (ref 74–99)
HCT VFR BLD AUTO: 25.7 % (ref 37–54)
HCT VFR BLD AUTO: 25.8 % (ref 37–54)
HCT VFR BLD AUTO: 26.1 % (ref 37–54)
HGB BLD-MCNC: 8.4 G/DL (ref 12.5–16.5)
HGB BLD-MCNC: 8.7 G/DL (ref 12.5–16.5)
HGB BLD-MCNC: 8.8 G/DL (ref 12.5–16.5)
MCH RBC QN AUTO: 31.9 PG (ref 26–35)
MCHC RBC AUTO-ENTMCNC: 33.7 G/DL (ref 32–34.5)
MCV RBC AUTO: 94.6 FL (ref 80–99.9)
OSMOLALITY SERPL: 296 MOSM/KG (ref 285–310)
OSMOLALITY UR: 467 MOSM/KG (ref 300–900)
PLATELET # BLD AUTO: 168 K/UL (ref 130–450)
PMV BLD AUTO: 10.5 FL (ref 7–12)
POTASSIUM SERPL-SCNC: 3.8 MMOL/L (ref 3.5–5)
POTASSIUM SERPL-SCNC: 3.8 MMOL/L (ref 3.5–5)
POTASSIUM SERPL-SCNC: 4 MMOL/L (ref 3.5–5)
POTASSIUM SERPL-SCNC: 4.1 MMOL/L (ref 3.5–5)
RBC # BLD AUTO: 2.76 M/UL (ref 3.8–5.8)
SODIUM SERPL-SCNC: 131 MMOL/L (ref 132–146)
SODIUM SERPL-SCNC: 134 MMOL/L (ref 132–146)
SODIUM UR-SCNC: <20 MMOL/L
TSH SERPL DL<=0.05 MIU/L-ACNC: 1.92 UIU/ML (ref 0.27–4.2)
URATE SERPL-MCNC: 4 MG/DL (ref 3.4–7)
WBC OTHER # BLD: 6.1 K/UL (ref 4.5–11.5)

## 2024-06-24 PROCEDURE — 85027 COMPLETE CBC AUTOMATED: CPT

## 2024-06-24 PROCEDURE — 97530 THERAPEUTIC ACTIVITIES: CPT | Performed by: PHYSICAL THERAPIST

## 2024-06-24 PROCEDURE — 86900 BLOOD TYPING SEROLOGIC ABO: CPT

## 2024-06-24 PROCEDURE — 2060000000 HC ICU INTERMEDIATE R&B

## 2024-06-24 PROCEDURE — 83930 ASSAY OF BLOOD OSMOLALITY: CPT

## 2024-06-24 PROCEDURE — 36415 COLL VENOUS BLD VENIPUNCTURE: CPT

## 2024-06-24 PROCEDURE — 86850 RBC ANTIBODY SCREEN: CPT

## 2024-06-24 PROCEDURE — 83935 ASSAY OF URINE OSMOLALITY: CPT

## 2024-06-24 PROCEDURE — 86923 COMPATIBILITY TEST ELECTRIC: CPT

## 2024-06-24 PROCEDURE — 6370000000 HC RX 637 (ALT 250 FOR IP): Performed by: INTERNAL MEDICINE

## 2024-06-24 PROCEDURE — 84300 ASSAY OF URINE SODIUM: CPT

## 2024-06-24 PROCEDURE — 2580000003 HC RX 258: Performed by: INTERNAL MEDICINE

## 2024-06-24 PROCEDURE — 30233N1 TRANSFUSION OF NONAUTOLOGOUS RED BLOOD CELLS INTO PERIPHERAL VEIN, PERCUTANEOUS APPROACH: ICD-10-PCS | Performed by: ORTHOPAEDIC SURGERY

## 2024-06-24 PROCEDURE — 84550 ASSAY OF BLOOD/URIC ACID: CPT

## 2024-06-24 PROCEDURE — 80048 BASIC METABOLIC PNL TOTAL CA: CPT

## 2024-06-24 PROCEDURE — P9016 RBC LEUKOCYTES REDUCED: HCPCS

## 2024-06-24 PROCEDURE — 2580000003 HC RX 258: Performed by: STUDENT IN AN ORGANIZED HEALTH CARE EDUCATION/TRAINING PROGRAM

## 2024-06-24 PROCEDURE — 84443 ASSAY THYROID STIM HORMONE: CPT

## 2024-06-24 PROCEDURE — 36430 TRANSFUSION BLD/BLD COMPNT: CPT

## 2024-06-24 PROCEDURE — 97110 THERAPEUTIC EXERCISES: CPT | Performed by: PHYSICAL THERAPIST

## 2024-06-24 PROCEDURE — 85014 HEMATOCRIT: CPT

## 2024-06-24 PROCEDURE — 85018 HEMOGLOBIN: CPT

## 2024-06-24 PROCEDURE — 86901 BLOOD TYPING SEROLOGIC RH(D): CPT

## 2024-06-24 RX ORDER — DEXTROSE MONOHYDRATE 50 MG/ML
INJECTION, SOLUTION INTRAVENOUS CONTINUOUS
Status: DISCONTINUED | OUTPATIENT
Start: 2024-06-24 | End: 2024-06-25

## 2024-06-24 RX ORDER — SODIUM CHLORIDE 9 MG/ML
INJECTION, SOLUTION INTRAVENOUS PRN
Status: DISCONTINUED | OUTPATIENT
Start: 2024-06-24 | End: 2024-06-27 | Stop reason: HOSPADM

## 2024-06-24 RX ADMIN — ACETAMINOPHEN 650 MG: 325 TABLET ORAL at 21:17

## 2024-06-24 RX ADMIN — DEXTROSE MONOHYDRATE: 50 INJECTION, SOLUTION INTRAVENOUS at 15:28

## 2024-06-24 RX ADMIN — ACETAMINOPHEN 650 MG: 325 TABLET ORAL at 12:36

## 2024-06-24 RX ADMIN — METFORMIN HYDROCHLORIDE 500 MG: 500 TABLET ORAL at 10:18

## 2024-06-24 RX ADMIN — ACETAMINOPHEN 650 MG: 325 TABLET ORAL at 06:49

## 2024-06-24 RX ADMIN — TAMSULOSIN HYDROCHLORIDE 0.4 MG: 0.4 CAPSULE ORAL at 10:18

## 2024-06-24 RX ADMIN — Medication 2000 UNITS: at 10:18

## 2024-06-24 RX ADMIN — Medication 10 ML: at 21:15

## 2024-06-24 RX ADMIN — CLOBETASOL PROPIONATE CREAM USP, 0.05%: 0.5 CREAM TOPICAL at 12:37

## 2024-06-24 RX ADMIN — Medication 10 ML: at 10:18

## 2024-06-24 RX ADMIN — ESCITALOPRAM OXALATE 20 MG: 10 TABLET ORAL at 10:18

## 2024-06-24 RX ADMIN — SODIUM CHLORIDE: 9 INJECTION, SOLUTION INTRAVENOUS at 04:34

## 2024-06-24 RX ADMIN — METOPROLOL SUCCINATE 25 MG: 25 TABLET, EXTENDED RELEASE ORAL at 09:30

## 2024-06-24 ASSESSMENT — PAIN DESCRIPTION - LOCATION
LOCATION: HIP

## 2024-06-24 ASSESSMENT — PAIN SCALES - WONG BAKER: WONGBAKER_NUMERICALRESPONSE: HURTS LITTLE MORE

## 2024-06-24 ASSESSMENT — PAIN DESCRIPTION - ORIENTATION
ORIENTATION: LEFT

## 2024-06-24 ASSESSMENT — PAIN DESCRIPTION - DESCRIPTORS: DESCRIPTORS: DISCOMFORT;ACHING

## 2024-06-24 ASSESSMENT — PAIN SCALES - GENERAL
PAINLEVEL_OUTOF10: 6
PAINLEVEL_OUTOF10: 6
PAINLEVEL_OUTOF10: 3
PAINLEVEL_OUTOF10: 5
PAINLEVEL_OUTOF10: 7
PAINLEVEL_OUTOF10: 2

## 2024-06-24 ASSESSMENT — PAIN - FUNCTIONAL ASSESSMENT: PAIN_FUNCTIONAL_ASSESSMENT: PREVENTS OR INTERFERES SOME ACTIVE ACTIVITIES AND ADLS

## 2024-06-24 NOTE — CONSULTS
Nephrology Consult Note  Patient's Name: Lonnie Pena  10:33 AM  6/24/2024    Nephrologist: N/A    Reason for Consult:  hyponatremia  Requesting Physician:  Alok Minaya MD    Chief Complaint:  hip fracture    History Obtained From:  patient, daughter at bedside, chart review    History of Present Illness:    Lonnie Pena is a 88 y.o. male with PMHx of Afib, T2DM, HTN, HLD, CHF, CAD s/p CABG, DVT, admitted to [facility] on 6/19 after having a fall out of the bed. He complained of left hip pain. Imaging suggestive of left hip fracture and he was admitted for further treatment.    Seen by Dr. Jennings for Orthopedic Surgery and he underwent left hip hemiarthroplasty on 6/20.    Nephrology has been consulted for hyponatremia. He has not seen Nephrology previously, it appears he has some underlying stage 3A CKD with eGFR 45-40 ml/min range. Previous llabs from [facility] show Cr 1.45 mg/dL in 2022 and 1.25 mg/dL in 2023 suggestive of his current level of kidney function.    He had labs on the day of admission. Serum Na 142 mmol/L on 6/19. No labs were drawn on 6/20, 6/21, or 6/22 in terms of a metabolic panel. Sodium yesterday PM was 126 (with BG of 272 true Na 129) and then 134 this AM prompting hyponatremia consult for concern of overcorrection.    Patient was seen and examined at bedside. The patient has some burden of dementia per chart review and hence much of the history is obtained from the patient's daughter at bedside although it is still somewhat limited in nature. Per the daughter, yesterday apparently there was some confusion, weakness, increased fatigue and a little unsteady on feet. However patient seems alert and answers all questions appropriately on my evaluation. There was some concern yesterday that patient was volume depleted and he was given IV fluids. He did respond going from corrected Na 129 to corrected Na 135 in under 24 hours.     Past Medical History:   Diagnosis Date     range though the hyperglycemia will require tighter BG control  -check BMP again this PM  -avoid over-correction, concern noted for the mild sxs of dizziness and gait unsteadiness  -will follow    2-BP/volume status  -BP 100s-110s/50s, some hypotension likely in setting of hypovolemia which would  explain the brisk improvement in serum Na  -continue to monitor  -patient on metoprolol succinate 25 mg daily, place holding parameters, would consider holding flomax as well for orthostasis    3-CKD Stage 3A  -baseline Cr 1.2-1.5 mg/dL with eGFR in 45-50 ml/min range, at baseline  -monitor  -update urine studies, previously no hematuria proteinuria (most recent UA 2019)        Thank you Alok [facility] MD for allowing us to participate in care of Lonnie Rene MD  10:33 AM  6/24/2024

## 2024-06-24 NOTE — PROGRESS NOTES
Primary Care Physician: Alok Minaya MD   Admitting Physician:  Alok Minaya MD  Admission date and time: 6/19/2024  3:52 PM    Room:  [address] [place] [address]    Admitting diagnosis:   Left hip fracture  Atrial fibrillation  Coronary artery disease  Bullous pemphigoid  Depression  Dementia  Hypertension  Hyperlipidemia  Benign prostatic hypertrophy  History of fall  Acute pain secondary to left hip fracture  Type 2 diabetes  History of DVT      Patient Name: Lonnie Pena  MRN: [id number]    Date of Service: 6/24/2024     Subjective:  Lonnie is a 88 y.o. male who was seen and examined today,6/24/2024, at the bedside.  Patient has advanced dementia he is Status post left hip arthroplasty , offers no complaints currently laying down comfortably was evaluated by cardiology for bradycardia pacemaker was interrogated they are not finding any issues at this time.     6/23/24  Got a call from nursing staff today patient had a low blood pressure of 94/43 this morning was feeling little dizzy I advised to check his hemoglobin which had dropped down quite a bit he is a status post left hip arthroplasty according to the nursing staff the incision was soaked with blood orthopedic has been  informed that advised the pressure bandage I ordered a hemoglobin to be done every 12 hours and also gave him a liter of fluid    No family present during my examination.    Review of System:   Constitutional:   Denies fever or chills, weight loss or gain, fatigue or malaise.  HEENT:   Denies ear pain, sore throat, sinus or eye problems.  Cardiovascular:   Denies any chest pain, irregular heartbeats, or palpitations.   Respiratory:   Denies shortness of breath, coughing, sputum production, hemoptysis, or wheezing.  Gastrointestinal:   Denies nausea, vomiting, diarrhea, or constipation.  Denies any abdominal pain.  Genitourinary:    Denies any urgency, frequency, hematuria. Voiding  without difficulty.  Extremities:   Positive for Minimal left hip  and periventricular microvascular ischemic disease   .         CT CERVICAL SPINE WO CONTRAST   Final Result   1. There is no acute compression fracture or subluxation of the cervical   spine.   2. Multilevel degenerative disc and degenerative joint disease.         CT THORACIC SPINE WO CONTRAST   Final Result   1. Moderate probable chronic anterior wedge compression deformity of T9. No   retropulsion of fragments.   2. Moderate osteopenia.   3. Multilevel degenerative changes.         CT LUMBAR SPINE WO CONTRAST   Final Result   1. No acute fracture or malalignment of the lumbar spine.   2. Moderate to severe multilevel facet arthropathy throughout the lumbar   spine.   3. Probable chronic mild circumferential canal stenosis at L3-4 due to   bulging disc and facet arthropathy.   4. Fusiform infrarenal abdominal aortic aneurysm measuring up to 3.3 cm in   diameter.      RECOMMENDATIONS:   Follow-up imaging of the abdominal aortic aneurysm every 3 years recommended.             Wound Documentation:        Assessment:  Hypotension  Left hip fracture  Atrial fibrillation  Sick sinus syndrome/bradycardia, patient has pacemaker with good function  Coronary artery disease  Bullous pemphigoid  Depression  Dementia  Hypertension  Hyperlipidemia  Benign prostatic hypertrophy  History of fall  Postop pain  Type 2 diabetes  History of DVT    Plan:   Admit the patient  Patient had low blood pressure today was feeling dizzy IV fluids were given with some relief H&H will be done every 12 hours will monitor closely orthopedics informed  Consult orthopedics  Pain management  DVT GI prophylaxis  Continue home medications  Status post left hip arthroplasty doing well postop  Appreciate cardiology input pacemaker was interrogated no more bradycardia will monitor patient closely  PT OT  Social service for discharge planning    Continue current therapy.  See orders for further plan of care.      More than 50% of my time was spent at the

## 2024-06-24 NOTE — CARE COORDINATION
6/24/2024 1115 CM Note:  Pt plan is to go to [facility] for rehab and will return to [facility] after rehab.  NO PRECERT NEEDED, WILL NEED HENS(initiated) and a Signed ABILIO.  Pt has met his 3 day qualifying stay.  Pt's blood count had dropped and required a blood transfusion.  Melisa duncanison at [facility] updated.  CM will follow. Electronically signed by Brooklynn Maloney RN on 6/24/2024 at 11:35 AM

## 2024-06-24 NOTE — PROGRESS NOTES
Physical Therapy  Physical Therapy Treatment Note/Plan of Care    Room #:  0621/0621-01  Patient Name: Lonnie Pena  YOB: 1935  MRN: [id number]    Date of Service: 6/24/2024     Tentative placement recommendation: Subacute Rehab  Equipment recommendation: To be determined      Evaluating Physical Therapist: Bucky Hwang, PT, DPT #[id number]      Specific Provider Orders/Date/Referring Provider :     06/19/24 1845    PT evaluation and treat  Start:  06/19/24 1845,   End:  06/19/24 1845,   ONE TIME,   Standing Count:  1 Occurrences,   R       Order went unreviewed at transfer on Wed Jun 19, 2024  8:04 PM  Alok Minaya MD Acknowledge New    Admitting Diagnosis:   Closed fracture of left hip, initial encounter ([facility]) [S72.002A]  Closed left hip fracture, initial encounter ([facility]) [S72.002A]  Hip fracture, left, closed, initial encounter ([facility]) [S72.002A]      Surgery: L hip hemiarthroplasty on 6/20/24  Visit Diagnoses         Codes    Closed fracture of left hip, initial encounter ([facility])    -  Primary S72.002A    S/p left hip fracture     Z87.81            Patient Active Problem List   Diagnosis    Stroke-like symptoms    Expressive aphasia    Hip fracture, left, closed, initial encounter ([facility])    Closed left hip fracture, initial encounter ([facility])    Fall        ASSESSMENT of Current Deficits Patient exhibits decreased strength, balance, and endurance impairing functional mobility, transfers, gait , gait distance, and tolerance to activity. Patient stood from bed with Brayan from elevated bed requiring ModA for short ambulation to chair. Pt ambulated length of bed with ModA with VC for sequencing, safety, and terminal knee extension during stance phase of gait. Pt agreeable to seated exercises following function during session.       PHYSICAL THERAPY  PLAN OF CARE       Physical therapy plan of care is established based on physician order,  patient diagnosis and clinical assessment    Current Treatment  and activity tolerance. Pt performed bed mobility, transfers, short ambulation in room, seated exercises.      Therapeutic Exercises:  ankle pumps, heel raises, long arc quad, and seated marching  x 10-15 reps     At end of session, patient in chair with alarm and daughter present call light and phone within reach,  all lines and tubes intact, nursing notified.      Patient would benefit from continued skilled Physical Therapy to improve functional independence and quality of life.         Patient's/ family goals   rehab    Time in   1037  Time out 1115    Total Treatment Time  38 minutes        CPT codes:  Therapeutic activities (93264)    26 minutes  2 unit(s)  Therapeutic exercises (40990)   12 minutes  1 unit(s)    Bucky Hwang, PT License Number [id number]

## 2024-06-24 NOTE — CONSENT
Informed Consent for Blood Component Transfusion Note    I have discussed with the patient the rationale for blood component transfusion; its benefits in treating or preventing fatigue, organ damage, or death; and its risk which includes mild transfusion reactions, rare risk of blood borne infection, or more serious but rare reactions. I have discussed the alternatives to transfusion, including the risk and consequences of not receiving transfusion. The patient had an opportunity to ask questions and had agreed to proceed with transfusion of blood components.    Electronically signed by Alok Minaya MD on 6/24/24 at 7:22 AM EDT

## 2024-06-24 NOTE — PROGRESS NOTES
Department of Orthopedic Surgery   Progress Note    Patient seen and examined. Pain controlled. No new complaints.  Denies chest pain, shortness of breath, calf pain, dizziness/lightheadedness, fevers or chills.     VITALS:  BP (!) 114/55   Pulse 61   Temp 97.8 °F (36.6 °C) (Oral)   Resp 18   Ht 1.803 m (5' 11\")   Wt 114 kg (251 lb 6.4 oz)   SpO2 96%   BMI 35.06 kg/m²     GENERAL: In bed, comfortable  MUSCULOSKELETAL:   left lower extremity:  Aquacel dressing is moderately saturated.  Reinforced.  Well-contained and no leaking noted  Compartments soft and compressible, calf non-tender  Palpable dorsalis pedis and posterior tibialis pulse, brisk cap refill to toes, foot warm and perfused  Sensation intact to light touch in sural/deep peroneal/superficial peroneal/saphenous/posterior tibial nerve distributions to foot/ankle.  Demonstrates active ankle plantar/dorsiflexion/great toe extension    CBC:   Lab Results   Component Value Date/Time    WBC 8.3 06/23/2024 09:51 AM    HGB 6.9 06/23/2024 11:32 PM    HCT 20.3 06/23/2024 11:32 PM     06/23/2024 09:51 AM       ASSESSMENT  Left hip hemiarthroplasty 6/20    PLAN    Weightbearing as tolerated left lower extremity, posterior hip precautions  Postop Ancef for surgical prophylaxis completed  DVT prophylaxis.  Per admitting team.  Patient is on Xarelto at baseline as an anticoagulation, recommend continuing and no need for aspirin.  Suspicion for drop in H&H is unlikely to be originating from the hip.  PT/OT as amenable  Pain control with moderate LV/p.o. per admitting team  DC: Pending all team reconciliation's.  Okay to discharge from orthopedic standpoint.  Will continue to monitor patient peripherally during hospital stay.  Please call if any question or concern    Electronically signed by Richard Nur DO, on 6/24/2024 at 6:36 AM

## 2024-06-24 NOTE — PLAN OF CARE
Problem: Discharge Planning  Goal: Discharge to home or other facility with appropriate resources  Outcome: Progressing  Flowsheets (Taken 6/23/2024 1909)  Discharge to home or other facility with appropriate resources: Identify barriers to discharge with patient and caregiver     Problem: Pain  Goal: Verbalizes/displays adequate comfort level or baseline comfort level  Outcome: Progressing     Problem: Skin/Tissue Integrity  Goal: Absence of new skin breakdown  Description: 1.  Monitor for areas of redness and/or skin breakdown  2.  Assess vascular access sites hourly  3.  Every 4-6 hours minimum:  Change oxygen saturation probe site  4.  Every 4-6 hours:  If on nasal continuous positive airway pressure, respiratory therapy assess nares and determine need for appliance change or resting period.  Outcome: Progressing     Problem: Safety - Adult  Goal: Free from fall injury  Outcome: Progressing

## 2024-06-25 LAB
ANION GAP SERPL CALCULATED.3IONS-SCNC: 8 MMOL/L (ref 7–16)
BACTERIA URNS QL MICRO: ABNORMAL
BILIRUB UR QL STRIP: NEGATIVE
BUN SERPL-MCNC: 26 MG/DL (ref 6–23)
CALCIUM SERPL-MCNC: 8.1 MG/DL (ref 8.6–10.2)
CHLORIDE SERPL-SCNC: 101 MMOL/L (ref 98–107)
CLARITY UR: CLEAR
CO2 SERPL-SCNC: 27 MMOL/L (ref 22–29)
COLOR UR: YELLOW
CREAT SERPL-MCNC: 1.4 MG/DL (ref 0.7–1.2)
CREAT UR-MCNC: 64.8 MG/DL (ref 40–278)
CREAT UR-MCNC: 65.6 MG/DL (ref 40–278)
ERYTHROCYTE [DISTWIDTH] IN BLOOD BY AUTOMATED COUNT: 13.5 % (ref 11.5–15)
GFR, ESTIMATED: 49 ML/MIN/1.73M2
GLUCOSE SERPL-MCNC: 184 MG/DL (ref 74–99)
GLUCOSE UR STRIP-MCNC: 100 MG/DL
HCT VFR BLD AUTO: 25.2 % (ref 37–54)
HCT VFR BLD AUTO: 27.1 % (ref 37–54)
HGB BLD-MCNC: 8.6 G/DL (ref 12.5–16.5)
HGB BLD-MCNC: 8.9 G/DL (ref 12.5–16.5)
HGB UR QL STRIP.AUTO: ABNORMAL
KETONES UR STRIP-MCNC: NEGATIVE MG/DL
LEUKOCYTE ESTERASE UR QL STRIP: NEGATIVE
MCH RBC QN AUTO: 31.9 PG (ref 26–35)
MCHC RBC AUTO-ENTMCNC: 34.1 G/DL (ref 32–34.5)
MCV RBC AUTO: 93.3 FL (ref 80–99.9)
MICROALBUMIN UR-MCNC: 31 MG/L (ref 0–19)
MICROALBUMIN/CREAT UR-RTO: 47 MCG/MG CREAT (ref 0–30)
NITRITE UR QL STRIP: NEGATIVE
PH UR STRIP: 5.5 [PH] (ref 5–9)
PLATELET # BLD AUTO: 186 K/UL (ref 130–450)
PMV BLD AUTO: 10.3 FL (ref 7–12)
POTASSIUM SERPL-SCNC: 3.9 MMOL/L (ref 3.5–5)
PROT UR STRIP-MCNC: NEGATIVE MG/DL
RBC # BLD AUTO: 2.7 M/UL (ref 3.8–5.8)
RBC #/AREA URNS HPF: ABNORMAL /HPF
SODIUM SERPL-SCNC: 136 MMOL/L (ref 132–146)
SP GR UR STRIP: 1.01 (ref 1–1.03)
TOTAL PROTEIN, URINE: 23 MG/DL (ref 0–12)
URINE TOTAL PROTEIN CREATININE RATIO: 0.35 (ref 0–0.2)
UROBILINOGEN UR STRIP-ACNC: 0.2 EU/DL (ref 0–1)
WBC #/AREA URNS HPF: ABNORMAL /HPF
WBC OTHER # BLD: 6.3 K/UL (ref 4.5–11.5)

## 2024-06-25 PROCEDURE — 84156 ASSAY OF PROTEIN URINE: CPT

## 2024-06-25 PROCEDURE — 97530 THERAPEUTIC ACTIVITIES: CPT

## 2024-06-25 PROCEDURE — 97535 SELF CARE MNGMENT TRAINING: CPT

## 2024-06-25 PROCEDURE — 85014 HEMATOCRIT: CPT

## 2024-06-25 PROCEDURE — 2580000003 HC RX 258: Performed by: INTERNAL MEDICINE

## 2024-06-25 PROCEDURE — 97110 THERAPEUTIC EXERCISES: CPT

## 2024-06-25 PROCEDURE — 82570 ASSAY OF URINE CREATININE: CPT

## 2024-06-25 PROCEDURE — 85027 COMPLETE CBC AUTOMATED: CPT

## 2024-06-25 PROCEDURE — 81001 URINALYSIS AUTO W/SCOPE: CPT

## 2024-06-25 PROCEDURE — 85018 HEMOGLOBIN: CPT

## 2024-06-25 PROCEDURE — 6370000000 HC RX 637 (ALT 250 FOR IP): Performed by: INTERNAL MEDICINE

## 2024-06-25 PROCEDURE — 82043 UR ALBUMIN QUANTITATIVE: CPT

## 2024-06-25 PROCEDURE — 80048 BASIC METABOLIC PNL TOTAL CA: CPT

## 2024-06-25 PROCEDURE — 2060000000 HC ICU INTERMEDIATE R&B

## 2024-06-25 PROCEDURE — 36415 COLL VENOUS BLD VENIPUNCTURE: CPT

## 2024-06-25 RX ADMIN — ACETAMINOPHEN 650 MG: 325 TABLET ORAL at 10:09

## 2024-06-25 RX ADMIN — CLOBETASOL PROPIONATE CREAM USP, 0.05%: 0.5 CREAM TOPICAL at 10:07

## 2024-06-25 RX ADMIN — CLOBETASOL PROPIONATE CREAM USP, 0.05%: 0.5 CREAM TOPICAL at 21:54

## 2024-06-25 RX ADMIN — METOPROLOL SUCCINATE 25 MG: 25 TABLET, EXTENDED RELEASE ORAL at 10:07

## 2024-06-25 RX ADMIN — TAMSULOSIN HYDROCHLORIDE 0.4 MG: 0.4 CAPSULE ORAL at 10:07

## 2024-06-25 RX ADMIN — METFORMIN HYDROCHLORIDE 500 MG: 500 TABLET ORAL at 10:07

## 2024-06-25 RX ADMIN — Medication 10 ML: at 22:00

## 2024-06-25 RX ADMIN — Medication 2000 UNITS: at 10:07

## 2024-06-25 RX ADMIN — Medication 10 ML: at 10:11

## 2024-06-25 RX ADMIN — ESCITALOPRAM OXALATE 20 MG: 10 TABLET ORAL at 10:07

## 2024-06-25 RX ADMIN — ACETAMINOPHEN 650 MG: 325 TABLET ORAL at 16:41

## 2024-06-25 ASSESSMENT — PAIN DESCRIPTION - LOCATION: LOCATION: HIP

## 2024-06-25 ASSESSMENT — PAIN SCALES - GENERAL
PAINLEVEL_OUTOF10: 6
PAINLEVEL_OUTOF10: 3
PAINLEVEL_OUTOF10: 8

## 2024-06-25 ASSESSMENT — PAIN SCALES - WONG BAKER: WONGBAKER_NUMERICALRESPONSE: HURTS LITTLE MORE

## 2024-06-25 ASSESSMENT — PAIN DESCRIPTION - DESCRIPTORS: DESCRIPTORS: DISCOMFORT;ACHING

## 2024-06-25 NOTE — PROGRESS NOTES
Nephrology Progress Note  Patient's Name: Lonnie Pena  4:56 PM  6/25/2024    Nephrologist: N/A    Reason for Consult:  hyponatremia  Requesting Physician:  Alok Minaya MD    Chief Complaint:  hip fracture    History Obtained From:  patient, daughter at bedside, chart review    History of Present Illness (6/24 consult):    Lonnie Pena is a 88 y.o. male with PMHx of Afib, T2DM, HTN, HLD, CHF, CAD s/p CABG, DVT, admitted to [facility] on 6/19 after having a fall out of the bed. He complained of left hip pain. Imaging suggestive of left hip fracture and he was admitted for further treatment.    Seen by Dr. Jennings for Orthopedic Surgery and he underwent left hip hemiarthroplasty on 6/20.    Nephrology has been consulted for hyponatremia. He has not seen Nephrology previously, it appears he has some underlying stage 3A CKD with eGFR 45-40 ml/min range. Previous llabs from [facility] show Cr 1.45 mg/dL in 2022 and 1.25 mg/dL in 2023 suggestive of his current level of kidney function.    He had labs on the day of admission. Serum Na 142 mmol/L on 6/19. No labs were drawn on 6/20, 6/21, or 6/22 in terms of a metabolic panel. Sodium yesterday PM was 126 (with BG of 272 true Na 129) and then 134 this AM prompting hyponatremia consult for concern of overcorrection.    Interval History:    6/25: Seen and examined. A family member is present. Patient feels better today. Not much dizziness or lightheadedness. BP stable. No confusion. Slight headache though not worse than his usual baseline. Not in any distress.     Past Medical History:   Diagnosis Date    A-fib (HCC)     Atrial fibrillation (HCC)     CAD (coronary artery disease)     CHF (congestive heart failure) (HCC)     Chronic kidney disease     Diabetes mellitus (HCC)     DVT (deep venous thrombosis) (HCC)     Hyperlipidemia     Hypertension     Inguinal hernia, bilateral     Thrombocytopenia (HCC)        Past Surgical History:   Procedure Laterality  Or    potassium bicarb-citric acid (EFFER-K) effervescent tablet 40 mEq  40 mEq Oral PRN Alok Minaya MD        Or    potassium chloride 10 mEq/100 mL IVPB (Peripheral Line)  10 mEq IntraVENous PRN Alok Minaya MD        potassium chloride 10 mEq/100 mL IVPB (Peripheral Line)  10 mEq IntraVENous PRN Alok Minaya MD        magnesium sulfate 2000 mg in 50 mL IVPB premix  2,000 mg IntraVENous PRN Alok Minaya MD        ondansetron (ZOFRAN-ODT) disintegrating tablet 4 mg  4 mg Oral Q8H PRN Alok Minaya MD        Or    ondansetron (ZOFRAN) injection 4 mg  4 mg IntraVENous Q6H PRN Alok Minaya MD        polyethylene glycol (GLYCOLAX) packet 17 g  17 g Oral Daily PRN lAok Minaya MD   17 g at 06/23/24 0752    acetaminophen (TYLENOL) tablet 650 mg  650 mg Oral Q6H PRN Alok Minaya MD   650 mg at 06/25/24 1641    Or    acetaminophen (TYLENOL) suppository 650 mg  650 mg Rectal Q6H PRN Alok Minaya MD        oxyCODONE (ROXICODONE) immediate release tablet 5 mg  5 mg Oral Q4H PRN Laith Rebolledo DO   5 mg at 06/22/24 0854    morphine (PF) injection 2 mg  2 mg IntraVENous Q4H PRN Laith Rebolledo DO   2 mg at 06/21/24 1477       REVIEW OF SYSTEMS:  See HPI - limited        PHYSICAL EXAM:     Vital SignsBP (!) 110/53   Pulse 63   Temp 97.7 °F (36.5 °C) (Oral)   Resp 20   Ht 1.803 m (5' 11\")   Wt 70 kg (154 lb 4.8 oz)   SpO2 96%   BMI 21.52 kg/m²   VITALS:  BP (!) 110/53   Pulse 63   Temp 97.7 °F (36.5 °C) (Oral)   Resp 20   Ht 1.803 m (5' 11\")   Wt 70 kg (154 lb 4.8 oz)   SpO2 96%   BMI 21.52 kg/m²   24HR INTAKE/OUTPUT:    Intake/Output Summary (Last 24 hours) at 6/25/2024 1656  Last data filed at 6/25/2024 0644  Gross per 24 hour   Intake --   Output 1600 ml   Net -1600 ml               HEENT: Head is normocephalic and atraumatic.  Pupils are equal and reactive to light and accommodation.  Fundus examination deferred.  Mouth and throat dry oral mucosa without any mucosal ulcerations or  exudates.  Neck: No JVD  Chest: Chest is symmetrical,  Lungs: Vesicular breath sounds decreased at the bases. No rales or ronchi.  Heart: Regular rate and rhythm. No S3 gallop. No  murmrur.  Abdomen: Soft non distended, non tender and normal bowel sounds.  Extremeties: No edema.          DATA:        XR HIP LEFT (2-3 VIEWS)   Final Result   1st postoperative study after placement of a left hip prosthesis.  The left   hip prosthesis is in proper position.         XR FEMUR LEFT (MIN 2 VIEWS)   Final Result   1. Impacted and foreshortened left femoral neck fracture.   2. Mildly comminuted parasymphyseal left pubic rami fractures.         XR HIP 2-3 VW W PELVIS LEFT   Final Result   Left femoral neck fracture.         CT HEAD WO CONTRAST   Final Result   1.  There is no acute intracranial abnormality.  Specifically, there is no   intracranial hemorrhage.   2. Atrophy and periventricular microvascular ischemic disease   .         CT CERVICAL SPINE WO CONTRAST   Final Result   1. There is no acute compression fracture or subluxation of the cervical   spine.   2. Multilevel degenerative disc and degenerative joint disease.         CT THORACIC SPINE WO CONTRAST   Final Result   1. Moderate probable chronic anterior wedge compression deformity of T9. No   retropulsion of fragments.   2. Moderate osteopenia.   3. Multilevel degenerative changes.         CT LUMBAR SPINE WO CONTRAST   Final Result   1. No acute fracture or malalignment of the lumbar spine.   2. Moderate to severe multilevel facet arthropathy throughout the lumbar   spine.   3. Probable chronic mild circumferential canal stenosis at L3-4 due to   bulging disc and facet arthropathy.   4. Fusiform infrarenal abdominal aortic aneurysm measuring up to 3.3 cm in   diameter.      RECOMMENDATIONS:   Follow-up imaging of the abdominal aortic aneurysm every 3 years recommended.               Labs:    CBC:   Recent Labs     06/23/24  0951 06/23/24  2332 06/24/24  1011  improvement in serum Na  -continue to monitor  -patient on metoprolol succinate 25 mg daily, place holding parameters, would consider holding flomax as well for orthostasis    3-CKD Stage 3A  -baseline Cr 1.2-1.5 mg/dL with eGFR in 45-50 ml/min range, at baseline  -monitor  -renal function at baseline, no significant proteinuria, given age would manage very conservatively no need for aggressive workup        Thank you Alok Dillon MD for allowing us to participate in care of Lonnie Rene MD  4:56 PM  6/25/2024

## 2024-06-25 NOTE — CARE COORDINATION
6/25/2024 1315 CM Note: Pt plan is to go to [facility] for rehab and will return to [facility] after rehab. NO PRECERT NEEDED, WILL NEED HENS(initiated) and a Signed ABILIO. Pt has met his 3 day qualifying stay. Pt's blood count has been stable but stool for OB has been ordered per Dr Minaya. Melisa liaison at [facility] updated. CM will follow. Electronically signed by Brooklynn Maloney RN on 6/25/2024 at 1:23 PM

## 2024-06-25 NOTE — PROGRESS NOTES
OCCUPATIONAL THERAPY TREATMENT NOTE    [facility]   [address] [facility]        Date:2024  Patient Name: Lonnie Pena  MRN: [id number]  : 1935  Room: [address] [place] [address]       Evaluating OT: Genia Guillen, OTR/L; [id number]        Referring Provider and Orders/Date:        OT eval and treat  Start:  24,   End:  24,   ONE TIME,   Standing Count:  1 Occurrences,   R       Order went unreviewed at transfer on   8:04 PM  Alok Minaya MD     Recommended placement: subacute rehab        Diagnosis:   1. Closed fracture of left hip, initial encounter ([facility])    2. S/p left hip fracture    3. Closed left hip fracture, initial encounter ([facility])          Surgery: Left hip hemiarthroplasty        Pertinent Medical History:        Past Medical History        Past Medical History:   Diagnosis Date    A-fib ([facility])      Atrial fibrillation (HCC)      CAD (coronary artery disease)      CHF (congestive heart failure) ([facility])      Chronic kidney disease      Diabetes mellitus (HCC)      DVT (deep venous thrombosis) ([facility])      Hyperlipidemia      Hypertension      Inguinal hernia, bilateral      Thrombocytopenia (HCC)               Past Surgical History         Past Surgical History:   Procedure Laterality Date    CORONARY ARTERY BYPASS GRAFT       HEMORRHOID SURGERY        HIP SURGERY Left 2024     LEFT HIP HEMIARTHROPLASTY performed by Lonnie [facility] DO at [facility] OR    PERICARDIUM SURGERY               Precautions:  Fall Risk-PTA, Weightbearing as tolerated left lower extremity, posterior hip precautions, advanced dementia        Assessment of current deficits     [x] Functional mobility           [x]ADLs           [x] Strength                   [x]Cognition     [x] Functional transfers          [x] IADLs          [x] Safety Awareness   [x]Endurance     [] Fine Coordination                         [x] Balance      []  request and fatigue. Lunch arriving and pt declining out of bed in the PM when offered. Mod A with PT earlier in the day and sitting up in arm chair MOD A sit>stand from EOB to w/w v/c's for hand placement   MIN A stand>sit to chair from w/w v/c's for hand placement  Minimal Assist    Functional Mobility  NT due to pt overall debility, decreased activity tolerance, balance deficits, safety and fall risk.     MIN A with w/w less than house hold distances with w/w v/c's for safety  Moderate Assist    Balance Sitting:     Static:  fair    Dynamic:fair-  Standing: NT Sitting:  Static: supervision   Dyamic: SBA  Sitting:     Static:  good    Dynamic:fair+  Standing: fair   Activity Tolerance Vitals with activity:poor+ due to fatigue in the PM      Increase sitting tolerance for >10min with stable vital signs for carry over into toileting, functional tranfers and indep in ADLs   Visual/  Perceptual Glasses: Present; WFL     Reports change in vision since admission: No      NA       Comments: Upon arrival pt supine in bed, agreeable to therapy session. Pt educated with regards to bed mobility, functional transfers, functional mobility, hand placement, walker safety, bathing tasks, grooming tasks, UE/LE dressing tasks. At end of session pt seated in chair,  all lines and tubes intact, call light within reach.     Pt has made fair  progress towards set goals.   Continue with current plan of care      Treatment Time In:0949            Treatment Time Out: 1028                Treatment Charges: Mins Units   Ther Ex  76720     Manual Therapy 18106     Thera Activities 61211 14 1   ADL/Home Mgt 94477 25 2   Neuro Re-ed 92619     Group Therapy      Orthotic manage/training  13718     Non-Billable Time     Total Timed Treatment 39 3       Treatment Time additionally includes review of current medical information, gathering information on past medical history/social history and prior level of function, interpretation of standardized

## 2024-06-25 NOTE — PROGRESS NOTES
Physical Therapy  Physical Therapy Treatment Note/Plan of Care    Room #:  0621/0621-01  Patient Name: Lonnie Pena  YOB: 1935  MRN: [id number]    Date of Service: 6/25/2024     Tentative placement recommendation: Subacute Rehab  Equipment recommendation: To be determined      Evaluating Physical Therapist: Bucky Hwang, PT, DPT #[id number]      Specific Provider Orders/Date/Referring Provider :     06/19/24 1845    PT evaluation and treat  Start:  06/19/24 1845,   End:  06/19/24 1845,   ONE TIME,   Standing Count:  1 Occurrences,   R       Order went unreviewed at transfer on Wed Jun 19, 2024  8:04 PM  Alok Minaya MD Acknowledge New    Admitting Diagnosis:   Closed fracture of left hip, initial encounter ([facility]) [S72.002A]  Closed left hip fracture, initial encounter ([facility]) [S72.002A]  Hip fracture, left, closed, initial encounter ([facility]) [S72.002A]      Surgery: L hip hemiarthroplasty on 6/20/24  Visit Diagnoses         Codes    Closed fracture of left hip, initial encounter ([facility])    -  Primary S72.002A    S/p left hip fracture     Z87.81            Patient Active Problem List   Diagnosis    Stroke-like symptoms    Expressive aphasia    Hip fracture, left, closed, initial encounter ([facility])    Closed left hip fracture, initial encounter ([facility])    Fall        ASSESSMENT of Current Deficits Patient exhibits decreased strength, balance, and endurance impairing functional mobility, transfers, gait , gait distance, and tolerance to activity. Patient fatigued d/t just working with COOK, prior to session. Patient able to perform 2 short gait distance, able to adhere to sequencing, max cues for terminal knee extension during stance phase of gait and neutral positioning of LLE.       PHYSICAL THERAPY  PLAN OF CARE       Physical therapy plan of care is established based on physician order,  patient diagnosis and clinical assessment    Current Treatment Recommendations:    -Bed Mobility: Lower and upper extremity  Supervision     Sit to supine: Supervision     Scooting: Supervision      Transfers Sit to stand: Moderate assist of 1  Sit to stand: Moderate assist of 1 from chair with bilateral UE support.  Sit to stand: Supervision     Ambulation     3-5 feet using  wheeled walker with Maximal assist of 1   for walker control, walker approximation, balance, upright, weight shift, and safety ~4 feet performing forward/backward steps using  wheeled walker with Moderate assist of 1       cues for increased step length, left knee extension in stance phase of gait, safety, and pacing     > 25 feet using  wheeled walker with Minimal assist of 1    ROM Within functional limits    Increase range of motion 10% of affected joints    Strength BUE:  refer to OT eval  RLE:  3+/5  LLE:   2+/5  Increase strength in affected mm groups by 1/3 grade   Balance Sitting EOB:  fair   Dynamic Standing:  fair- with WW Sitting EOB: not assessed   Dynamic Standing: fair- with wheeled walker flexed posture and Left knee flexed.    Sitting EOB:  fair+  Dynamic Standing: fair with WW     Patient is Alert & Oriented x person and follows one step directions very confused, talks softly   Sensation:  Patient  denies numbness/tingling   Edema:  yes left lower extremity more at hip with bruising and some seeping   Endurance: fair -    Vitals: room air   Blood Pressure at rest  Blood Pressure during session    Heart Rate at rest  Heart Rate during session    SPO2 at rest %  SPO2 during session %     Patient education  Patient educated on role of Physical Therapy, risks of immobility, safety and plan of care, energy conservation,  importance of mobility while in hospital , importance and purpose of adaptive device and adjusted to proper height for the patient., safety , weight bearing status , left knee extension in bed and during stance phase of gait, and seated exercises      Patient response to education:   Pt verbalized understanding Pt demonstrated skill Pt

## 2024-06-26 LAB
ABO/RH: NORMAL
ANION GAP SERPL CALCULATED.3IONS-SCNC: 9 MMOL/L (ref 7–16)
ANTIBODY SCREEN: NEGATIVE
ARM BAND NUMBER: NORMAL
BLOOD BANK BLOOD PRODUCT EXPIRATION DATE: NORMAL
BLOOD BANK DISPENSE STATUS: NORMAL
BLOOD BANK ISBT PRODUCT BLOOD TYPE: 6200
BLOOD BANK PRODUCT CODE: NORMAL
BLOOD BANK SAMPLE EXPIRATION: NORMAL
BLOOD BANK UNIT TYPE AND RH: NORMAL
BPU ID: NORMAL
BUN SERPL-MCNC: 22 MG/DL (ref 6–23)
CALCIUM SERPL-MCNC: 8.3 MG/DL (ref 8.6–10.2)
CHLORIDE SERPL-SCNC: 102 MMOL/L (ref 98–107)
CO2 SERPL-SCNC: 24 MMOL/L (ref 22–29)
COMPONENT: NORMAL
CREAT SERPL-MCNC: 1.2 MG/DL (ref 0.7–1.2)
CROSSMATCH RESULT: NORMAL
ERYTHROCYTE [DISTWIDTH] IN BLOOD BY AUTOMATED COUNT: 13.4 % (ref 11.5–15)
GFR, ESTIMATED: 58 ML/MIN/1.73M2
GLUCOSE SERPL-MCNC: 150 MG/DL (ref 74–99)
HCT VFR BLD AUTO: 28.4 % (ref 37–54)
HGB BLD-MCNC: 9.1 G/DL (ref 12.5–16.5)
MCH RBC QN AUTO: 30.1 PG (ref 26–35)
MCHC RBC AUTO-ENTMCNC: 32 G/DL (ref 32–34.5)
MCV RBC AUTO: 94 FL (ref 80–99.9)
PLATELET # BLD AUTO: 223 K/UL (ref 130–450)
PMV BLD AUTO: 9.7 FL (ref 7–12)
POTASSIUM SERPL-SCNC: 4.1 MMOL/L (ref 3.5–5)
RBC # BLD AUTO: 3.02 M/UL (ref 3.8–5.8)
SODIUM SERPL-SCNC: 135 MMOL/L (ref 132–146)
TRANSFUSION STATUS: NORMAL
UNIT DIVISION: 0
UNIT ISSUE DATE/TIME: NORMAL
WBC OTHER # BLD: 8.3 K/UL (ref 4.5–11.5)

## 2024-06-26 PROCEDURE — 85027 COMPLETE CBC AUTOMATED: CPT

## 2024-06-26 PROCEDURE — 6370000000 HC RX 637 (ALT 250 FOR IP): Performed by: INTERNAL MEDICINE

## 2024-06-26 PROCEDURE — 97110 THERAPEUTIC EXERCISES: CPT | Performed by: PHYSICAL THERAPIST

## 2024-06-26 PROCEDURE — 36415 COLL VENOUS BLD VENIPUNCTURE: CPT

## 2024-06-26 PROCEDURE — 2580000003 HC RX 258: Performed by: INTERNAL MEDICINE

## 2024-06-26 PROCEDURE — 80048 BASIC METABOLIC PNL TOTAL CA: CPT

## 2024-06-26 PROCEDURE — 2060000000 HC ICU INTERMEDIATE R&B

## 2024-06-26 PROCEDURE — 97530 THERAPEUTIC ACTIVITIES: CPT | Performed by: PHYSICAL THERAPIST

## 2024-06-26 RX ADMIN — ESCITALOPRAM OXALATE 20 MG: 10 TABLET ORAL at 09:30

## 2024-06-26 RX ADMIN — METOPROLOL SUCCINATE 25 MG: 25 TABLET, EXTENDED RELEASE ORAL at 09:29

## 2024-06-26 RX ADMIN — Medication 10 ML: at 22:03

## 2024-06-26 RX ADMIN — METFORMIN HYDROCHLORIDE 500 MG: 500 TABLET ORAL at 09:29

## 2024-06-26 RX ADMIN — TAMSULOSIN HYDROCHLORIDE 0.4 MG: 0.4 CAPSULE ORAL at 09:29

## 2024-06-26 RX ADMIN — CLOBETASOL PROPIONATE CREAM USP, 0.05%: 0.5 CREAM TOPICAL at 09:28

## 2024-06-26 RX ADMIN — POLYETHYLENE GLYCOL 3350 17 G: 17 POWDER, FOR SOLUTION ORAL at 09:27

## 2024-06-26 RX ADMIN — ACETAMINOPHEN 650 MG: 325 TABLET ORAL at 21:39

## 2024-06-26 RX ADMIN — CLOBETASOL PROPIONATE CREAM USP, 0.05%: 0.5 CREAM TOPICAL at 21:41

## 2024-06-26 RX ADMIN — Medication 2000 UNITS: at 09:29

## 2024-06-26 RX ADMIN — ACETAMINOPHEN 650 MG: 325 TABLET ORAL at 09:28

## 2024-06-26 RX ADMIN — Medication 10 ML: at 09:27

## 2024-06-26 ASSESSMENT — PAIN - FUNCTIONAL ASSESSMENT
PAIN_FUNCTIONAL_ASSESSMENT: PREVENTS OR INTERFERES SOME ACTIVE ACTIVITIES AND ADLS
PAIN_FUNCTIONAL_ASSESSMENT: PREVENTS OR INTERFERES SOME ACTIVE ACTIVITIES AND ADLS

## 2024-06-26 ASSESSMENT — PAIN DESCRIPTION - DESCRIPTORS
DESCRIPTORS: SHARP
DESCRIPTORS: ACHING;DISCOMFORT

## 2024-06-26 ASSESSMENT — PAIN DESCRIPTION - LOCATION
LOCATION: LEG;HIP
LOCATION: LEG

## 2024-06-26 ASSESSMENT — PAIN SCALES - GENERAL
PAINLEVEL_OUTOF10: 4
PAINLEVEL_OUTOF10: 7
PAINLEVEL_OUTOF10: 8

## 2024-06-26 ASSESSMENT — PAIN DESCRIPTION - ORIENTATION: ORIENTATION: LEFT

## 2024-06-26 ASSESSMENT — PAIN SCALES - WONG BAKER: WONGBAKER_NUMERICALRESPONSE: HURTS LITTLE MORE

## 2024-06-26 NOTE — PROGRESS NOTES
hepatosplenomegaly  Extremity pain on movement of the left.  S/p total hip arthroplasty surgical site saturated with blood pressure dressing applied  Neuro nonfocal cranial nerves II through XII intact  Psych unremarkable    Objective Data:  CBC with Differential:    Lab Results   Component Value Date/Time    WBC 8.3 06/26/2024 07:08 AM    RBC 3.02 06/26/2024 07:08 AM    HGB 9.1 06/26/2024 07:08 AM    HCT 28.4 06/26/2024 07:08 AM     06/26/2024 07:08 AM    MCV 94.0 06/26/2024 07:08 AM    MCH 30.1 06/26/2024 07:08 AM    MCHC 32.0 06/26/2024 07:08 AM    RDW 13.4 06/26/2024 07:08 AM    LYMPHOPCT 15 06/20/2024 04:36 AM    MONOPCT 8 06/20/2024 04:36 AM    EOSPCT 0 06/20/2024 04:36 AM    BASOPCT 0 06/20/2024 04:36 AM    MONOSABS 0.93 06/20/2024 04:36 AM    LYMPHSABS 1.60 02/20/2014 08:17 AM    EOSABS 0.01 06/20/2024 04:36 AM    BASOSABS 0.02 06/20/2024 04:36 AM     CMP:    Lab Results   Component Value Date/Time     06/26/2024 07:08 AM    K 4.1 06/26/2024 07:08 AM    K 4.2 03/19/2019 06:21 AM     06/26/2024 07:08 AM    CO2 24 06/26/2024 07:08 AM    BUN 22 06/26/2024 07:08 AM    CREATININE 1.2 06/26/2024 07:08 AM    GFRAA >60 03/19/2019 06:21 AM    LABGLOM 58 06/26/2024 07:08 AM    GLUCOSE 150 06/26/2024 07:08 AM    GLUCOSE 117 03/27/2012 09:14 AM    CALCIUM 8.3 06/26/2024 07:08 AM    BILITOT 0.8 06/23/2024 11:44 AM    ALKPHOS 53 06/23/2024 11:44 AM    AST 20 06/23/2024 11:44 AM    ALT 7 06/23/2024 11:44 AM     Albumin:  No results found for: \"LABALBU\"    XR HIP LEFT (2-3 VIEWS)   Final Result   1st postoperative study after placement of a left hip prosthesis.  The left   hip prosthesis is in proper position.         XR FEMUR LEFT (MIN 2 VIEWS)   Final Result   1. Impacted and foreshortened left femoral neck fracture.   2. Mildly comminuted parasymphyseal left pubic rami fractures.         XR HIP 2-3 VW W PELVIS LEFT   Final Result   Left femoral neck fracture.         CT HEAD WO CONTRAST   Final Result  bradycardia will monitor patient closely  PT OT  Social service for discharge planning    Continue current therapy.  See orders for further plan of care.      More than 50% of my time was spent at the bedside counseling/coordinating care with the patient and/or family with face to face contact.  This time was spent reviewing notes and laboratory data as well as instructing and counseling the patient. Time I spent with the family or surrogate(s) is included only if the patient was incapable of providing the necessary information or participating in medical decisions. I also discussed the differential diagnosis and all of the proposed management plans with the patient and individuals accompanying the patient. I am readily available for any further decision-making and intervention.       Electronically signed by Alok Minaya MD on 6/26/2024 at 11:18 AM

## 2024-06-26 NOTE — CARE COORDINATION
6/26/2024 1125  CM Note: Pt plan is to go to [facility] for rehab and will return to [facility] after rehab. NO PRECERT NEEDED, WILL NEED HENS(initiated) and a Signed ABILIO.  Per Melisa clemente she will have a bed for him on Thursday.  CM will follow. Electronically signed by Brooklynn Maloney RN on 6/26/2024 at 11:35 AM

## 2024-06-26 NOTE — PLAN OF CARE
Problem: Discharge Planning  Goal: Discharge to home or other facility with appropriate resources  Outcome: Progressing  Flowsheets (Taken 6/25/2024 2000)  Discharge to home or other facility with appropriate resources:   Identify barriers to discharge with patient and caregiver   Arrange for needed discharge resources and transportation as appropriate   Identify discharge learning needs (meds, wound care, etc)   Refer to discharge planning if patient needs post-hospital services based on physician order or complex needs related to functional status, cognitive ability or social support system     Problem: Pain  Goal: Verbalizes/displays adequate comfort level or baseline comfort level  Outcome: Progressing     Problem: Skin/Tissue Integrity  Goal: Absence of new skin breakdown  Description: 1.  Monitor for areas of redness and/or skin breakdown  2.  Assess vascular access sites hourly  3.  Every 4-6 hours minimum:  Change oxygen saturation probe site  4.  Every 4-6 hours:  If on nasal continuous positive airway pressure, respiratory therapy assess nares and determine need for appliance change or resting period.  Outcome: Progressing     Problem: Safety - Adult  Goal: Free from fall injury  Outcome: Progressing     Problem: ABCDS Injury Assessment  Goal: Absence of physical injury  Outcome: Progressing     Problem: Chronic Conditions and Co-morbidities  Goal: Patient's chronic conditions and co-morbidity symptoms are monitored and maintained or improved  Outcome: Progressing  Flowsheets (Taken 6/25/2024 2000)  Care Plan - Patient's Chronic Conditions and Co-Morbidity Symptoms are Monitored and Maintained or Improved: Monitor and assess patient's chronic conditions and comorbid symptoms for stability, deterioration, or improvement

## 2024-06-26 NOTE — PROGRESS NOTES
Primary Care Physician: Alok Minaya MD   Admitting Physician:  Alok Minaya MD  Admission date and time: 6/19/2024  3:52 PM    Room:  [address] [place] [address]    Admitting diagnosis:   Left hip fracture  Atrial fibrillation  Coronary artery disease  Bullous pemphigoid  Depression  Dementia  Hypertension  Hyperlipidemia  Benign prostatic hypertrophy  History of fall  Acute pain secondary to left hip fracture  Type 2 diabetes  History of DVT      Patient Name: Lonnie Pena  MRN: [id number]    Date of Service: 6/25/2024     Subjective:  Lonnie is a 88 y.o. male who was seen and examined today,6/25/2024, at the bedside.  Patient has advanced dementia he is Status post left hip arthroplasty , offers no complaints currently laying down comfortably was evaluated by cardiology for bradycardia pacemaker was interrogated they are not finding any issues at this time.     6/23/24  Got a call from nursing staff today patient had a low blood pressure of 94/43 this morning was feeling little dizzy I advised to check his hemoglobin which had dropped down quite a bit he is a status post left hip arthroplasty according to the nursing staff the incision was soaked with blood orthopedic has been  informed that advised the pressure bandage I ordered a hemoglobin to be done every 12 hours and also gave him a liter of fluid    No family present during my examination.    Review of System:   Constitutional:   Denies fever or chills, weight loss or gain, fatigue or malaise.  HEENT:   Denies ear pain, sore throat, sinus or eye problems.  Cardiovascular:   Denies any chest pain, irregular heartbeats, or palpitations.   Respiratory:   Denies shortness of breath, coughing, sputum production, hemoptysis, or wheezing.  Gastrointestinal:   Denies nausea, vomiting, diarrhea, or constipation.  Denies any abdominal pain.  Genitourinary:    Denies any urgency, frequency, hematuria. Voiding  without difficulty.  Extremities:   Positive for Minimal left hip  spent at the bedside counseling/coordinating care with the patient and/or family with face to face contact.  This time was spent reviewing notes and laboratory data as well as instructing and counseling the patient. Time I spent with the family or surrogate(s) is included only if the patient was incapable of providing the necessary information or participating in medical decisions. I also discussed the differential diagnosis and all of the proposed management plans with the patient and individuals accompanying the patient. I am readily available for any further decision-making and intervention.       Electronically signed by Alok Minaya MD on 6/25/2024 at 9:53 PM

## 2024-06-26 NOTE — PROGRESS NOTES
Physical Therapy  Physical Therapy Treatment Note/Plan of Care    Room #:  0621/0621-01  Patient Name: Lonnie Pena  YOB: 1935  MRN: [id number]    Date of Service: 6/26/2024     Tentative placement recommendation: Subacute Rehab  Equipment recommendation: To be determined      Evaluating Physical Therapist: Bucky Hwang, PT, DPT #[id number]      Specific Provider Orders/Date/Referring Provider :     06/19/24 1845    PT evaluation and treat  Start:  06/19/24 1845,   End:  06/19/24 1845,   ONE TIME,   Standing Count:  1 Occurrences,   R       Order went unreviewed at transfer on Wed Jun 19, 2024  8:04 PM  Alok Minaya MD Acknowledge New    Admitting Diagnosis:   Closed fracture of left hip, initial encounter ([facility]) [S72.002A]  Closed left hip fracture, initial encounter ([facility]) [S72.002A]  Hip fracture, left, closed, initial encounter ([facility]) [S72.002A]      Surgery: L hip hemiarthroplasty on 6/20/24  Visit Diagnoses         Codes    Closed fracture of left hip, initial encounter ([facility])    -  Primary S72.002A    S/p left hip fracture     Z87.81            Patient Active Problem List   Diagnosis    Stroke-like symptoms    Expressive aphasia    Hip fracture, left, closed, initial encounter ([facility])    Closed left hip fracture, initial encounter ([facility])    Fall        ASSESSMENT of Current Deficits Patient exhibits decreased strength, balance, and endurance impairing functional mobility, transfers, gait , gait distance, and tolerance to activity. Patient needed reminding of hip precautions as he was not able to list them but was able to adhere to them during session. Pt stood with Brayan from elevated bed with ModA needed for ambulation during bed. Pt required instruction for safety, sequencing, and terminal knee extension during stance phase of gait. Pt agreeable to seated exercises following function.       PHYSICAL THERAPY  PLAN OF CARE       Physical therapy plan of care is established based on physician order,

## 2024-06-26 NOTE — FLOWSHEET NOTE
Inpatient Wound Care    Admit Date: 6/19/2024  3:52 PM    Reason for consult:  coccyx left knee    Significant history:    Past Medical History:   Diagnosis Date    A-fib (HCC)     Atrial fibrillation (HCC)     CAD (coronary artery disease)     CHF (congestive heart failure) (HCC)     Chronic kidney disease     Diabetes mellitus (HCC)     DVT (deep venous thrombosis) (HCC)     Hyperlipidemia     Hypertension     Inguinal hernia, bilateral     Thrombocytopenia (HCC)        Wound history:      Findings:     06/26/24 1115   Wound 06/24/24 Coccyx Posterior Coccyx-open with slough   Date First Assessed/Time First Assessed: 06/24/24 1000   Location: Coccyx  Wound Location Orientation: Posterior  Wound Description (Comments): Coccyx-open with slough   Wound Image    Wound Etiology Pressure Stage 2   Wound Cleansed Cleansed with saline   Dressing/Treatment Foam   Wound Length (cm) 3 cm   Wound Width (cm) 1.5 cm   Wound Depth (cm) 0.2 cm   Wound Surface Area (cm^2) 4.5 cm^2   Change in Wound Size % (l*w) -50   Wound Volume (cm^3) 0.9 cm^3   Wound Assessment Pink/red   Drainage Amount Scant (moist but unmeasurable)   Drainage Description Serosanguinous   Odor None   Keturah-wound Assessment Non-blanchable erythema   Wound 06/26/24 Knee Left   Date First Assessed/Time First Assessed: 06/26/24 1117   Primary Wound Type: Traumatic  Location: Knee  Wound Location Orientation: Left   Wound Image    Wound Etiology Traumatic   Wound Cleansed Cleansed with saline   Dressing/Treatment Foam   Wound Length (cm) 3 cm   Wound Width (cm) 2 cm   Wound Depth (cm) 0.5 cm   Wound Surface Area (cm^2) 6 cm^2   Wound Volume (cm^3) 3 cm^3   Wound Assessment Pink/red   Drainage Amount Small (< 25%)   Drainage Description Serosanguinous   Odor None         Impression:  coccyx stage 2 pressure injury  Left knee trauma    Interventions in place:  using mepilex      Plan:  Use alginate and mepilex  Change mon wed friday      Anayeli Banuelos RN 6/26/2024

## 2024-06-26 NOTE — PROGRESS NOTES
Comprehensive Nutrition Assessment    Type and Reason for Visit:  Initial, Wound, Consult, RD Nutrition Re-Screen/LOS    Nutrition Recommendations/Plan:   Recommend modifying 4 carb ctrl and add ONS Zaid BID and Ensure HP/low kcal daily  Continue to monitor diet as tolerated and f/up per policy      Malnutrition Assessment:  Malnutrition Status:  No malnutrition (06/26/24 1332)    Context:  Acute Illness     Findings of the 6 clinical characteristics of malnutrition:  Energy Intake:  No significant decrease in energy intake  Weight Loss:  Unable to assess (RY d.t no recent measurd wt hx)     Body Fat Loss:  No significant body fat loss     Muscle Mass Loss:  No significant muscle mass loss    Fluid Accumulation:  No significant fluid accumulation     Strength:  Not Performed    Nutrition Assessment:    Pt admit for L Hip fracture s/p L-hip arthroplasty. Hyponatremia resloved.  PMHx: a-fib, CAD, CHF, CKD, T2DM  ,DVT HLD, HTN, thrombocytopenia, bullous pemphigus and advanced dementia. Pt has a decent appetite w/ po intake of (25-50%). Recommend modifying diet to 4crb choices , adding Zaid BID and Ensure HP.  Will continue to f/up per policy.    Nutrition Related Findings:    a&Ox4, dementia , i/os -1.5L , Active Bs, abd distended/round, pace maker, constipation, BGlu 150-338, hgb 9.1, Hct 23,4 Wound Type: Multiple, Pressure Injury, Stage II, Surgical Incision, Wound Consult Pending (coccyx, L-knee, L -Hip)       Current Nutrition Intake & Therapies:    Average Meal Intake: 26-50%  Average Supplements Intake: None Ordered  ADULT ORAL NUTRITION SUPPLEMENT; Lunch, Dinner; Wound Healing Oral Supplement  ADULT ORAL NUTRITION SUPPLEMENT; Lunch; Low Calorie/High Protein Oral Supplement  ADULT DIET; Regular; 4 carb choices (60 gm/meal)    Anthropometric Measures:  Height: 180.3 cm (5' 10.98\")  Ideal Body Weight (IBW): 172 lbs (78 kg)    Admission Body Weight: 73.5 kg (162 lb) (6/19)  Current Body Weight: 70.3 kg (155

## 2024-06-26 NOTE — PROGRESS NOTES
SPIRITUAL HEALTH SERVICES - [facility] Encounter    Name: Lonnie Pena                  Referral: Routine Visit    Sacraments  Anointed (Last Rites): Yes  Apostolic [place]: No  Confession: No  Communion: Yes     Assessment:  Patient receptive to  visit.      Intervention:   provided spiritual support and sacramental ministry for patient.     Outcome:  Patient expressed gratitude for visit.    Plan:  Chaplains will remain available to offer spiritual and emotional support as needed.      Electronically signed by Chaplain Keron, on 6/26/2024 at 1:35 PM.  Spiritual Care Department  [facility]  [phone]

## 2024-06-26 NOTE — PLAN OF CARE
Hyponatremia resolved, clinically stable and renal function at baseline. Minimal proteinuria, no significant management indicated from renal perspective. Little else to add at this time, Nephrology will sign off. Please call back if needed.    Kamari Rene MD

## 2024-06-27 VITALS
SYSTOLIC BLOOD PRESSURE: 110 MMHG | TEMPERATURE: 98.6 F | WEIGHT: 155 LBS | RESPIRATION RATE: 16 BRPM | HEART RATE: 59 BPM | HEIGHT: 71 IN | DIASTOLIC BLOOD PRESSURE: 59 MMHG | BODY MASS INDEX: 21.7 KG/M2 | OXYGEN SATURATION: 99 %

## 2024-06-27 PROCEDURE — 6370000000 HC RX 637 (ALT 250 FOR IP): Performed by: INTERNAL MEDICINE

## 2024-06-27 PROCEDURE — 6370000000 HC RX 637 (ALT 250 FOR IP)

## 2024-06-27 PROCEDURE — 2580000003 HC RX 258: Performed by: INTERNAL MEDICINE

## 2024-06-27 PROCEDURE — 97530 THERAPEUTIC ACTIVITIES: CPT | Performed by: PHYSICAL THERAPIST

## 2024-06-27 RX ORDER — OXYCODONE HYDROCHLORIDE 5 MG/1
5 TABLET ORAL EVERY 4 HOURS PRN
Qty: 12 TABLET | Refills: 0
Start: 2024-06-27 | End: 2024-06-30

## 2024-06-27 RX ORDER — POLYETHYLENE GLYCOL 3350 17 G/17G
17 POWDER, FOR SOLUTION ORAL DAILY PRN
Qty: 450 G | Refills: 1
Start: 2024-06-27 | End: 2024-08-19

## 2024-06-27 RX ORDER — CLOBETASOL PROPIONATE 0.5 MG/G
CREAM TOPICAL
Qty: 80 G | Refills: 5
Start: 2024-06-27 | End: 2025-07-01

## 2024-06-27 RX ADMIN — ESCITALOPRAM OXALATE 20 MG: 10 TABLET ORAL at 10:23

## 2024-06-27 RX ADMIN — POLYETHYLENE GLYCOL 3350 17 G: 17 POWDER, FOR SOLUTION ORAL at 10:23

## 2024-06-27 RX ADMIN — Medication 10 ML: at 10:23

## 2024-06-27 RX ADMIN — METFORMIN HYDROCHLORIDE 500 MG: 500 TABLET ORAL at 10:23

## 2024-06-27 RX ADMIN — ACETAMINOPHEN 650 MG: 325 TABLET ORAL at 15:07

## 2024-06-27 RX ADMIN — METOPROLOL SUCCINATE 25 MG: 25 TABLET, EXTENDED RELEASE ORAL at 10:23

## 2024-06-27 RX ADMIN — TAMSULOSIN HYDROCHLORIDE 0.4 MG: 0.4 CAPSULE ORAL at 10:23

## 2024-06-27 RX ADMIN — CLOBETASOL PROPIONATE CREAM USP, 0.05%: 0.5 CREAM TOPICAL at 10:23

## 2024-06-27 RX ADMIN — Medication 2000 UNITS: at 10:23

## 2024-06-27 RX ADMIN — OXYCODONE HYDROCHLORIDE 5 MG: 5 TABLET ORAL at 15:07

## 2024-06-27 ASSESSMENT — PAIN DESCRIPTION - FREQUENCY: FREQUENCY: INTERMITTENT

## 2024-06-27 ASSESSMENT — PAIN DESCRIPTION - DESCRIPTORS: DESCRIPTORS: ACHING;SORE

## 2024-06-27 ASSESSMENT — PAIN SCALES - GENERAL: PAINLEVEL_OUTOF10: 7

## 2024-06-27 ASSESSMENT — PAIN DESCRIPTION - ORIENTATION: ORIENTATION: LEFT

## 2024-06-27 ASSESSMENT — PAIN DESCRIPTION - PAIN TYPE: TYPE: ACUTE PAIN

## 2024-06-27 ASSESSMENT — PAIN DESCRIPTION - LOCATION: LOCATION: LEG

## 2024-06-27 NOTE — PROGRESS NOTES
Physical Therapy  Physical Therapy Treatment Note/Plan of Care    Room #:  0621/0621-01  Patient Name: Lonnie Pena  YOB: 1935  MRN: [id number]    Date of Service: 6/27/2024     Tentative placement recommendation: Subacute Rehab  Equipment recommendation: To be determined      Evaluating Physical Therapist: Bucky Hwang, PT, DPT #[id number]      Specific Provider Orders/Date/Referring Provider :     06/19/24 1845    PT evaluation and treat  Start:  06/19/24 1845,   End:  06/19/24 1845,   ONE TIME,   Standing Count:  1 Occurrences,   R       Order went unreviewed at transfer on Wed Jun 19, 2024  8:04 PM  Alok Minaya MD Acknowledge New    Admitting Diagnosis:   Closed fracture of left hip, initial encounter ([facility]) [S72.002A]  Closed left hip fracture, initial encounter ([facility]) [S72.002A]  Hip fracture, left, closed, initial encounter ([facility]) [S72.002A]      Surgery: L hip hemiarthroplasty on 6/20/24  Visit Diagnoses         Codes    Closed fracture of left hip, initial encounter ([facility])     S72.002A    S/p left hip fracture     Z87.81            Patient Active Problem List   Diagnosis    Stroke-like symptoms    Expressive aphasia    Hip fracture, left, closed, initial encounter ([facility])    Closed left hip fracture, initial encounter ([facility])    Fall        ASSESSMENT of Current Deficits Patient exhibits decreased strength, balance, and endurance impairing functional mobility, transfers, gait , gait distance, and tolerance to activity. Patient remembered hip precautions prior to session when asked and was able to adhere to them during session. Pt stood with Brayan from elevated bed with ModA needed for ambulation along bed with increase in tolerance for activity. Pt required instruction for safety, sequencing, and terminal knee extension during stance phase of gait. Pt agreeable to seated exercises following function.       PHYSICAL THERAPY  PLAN OF CARE       Physical therapy plan of care is established based on    Supine to sit: Supervision    Sit to supine: Supervision    Scooting: Supervision     Rolling: Supervision     Supine to sit: Supervision     Sit to supine: Supervision     Scooting: Supervision      Transfers Sit to stand: Moderate assist of 1  Sit to stand: Minimal assist of 1 from elevated bed; ModA from chair   Sit to stand: Supervision     Ambulation     3-5 feet using  wheeled walker with Maximal assist of 1   for walker control, walker approximation, balance, upright, weight shift, and safety 2 x 12 forward/backward steps using  wheeled walker with Moderate assist of 1       cues for increased step length, left knee extension in stance phase of gait, safety, and pacing     > 25 feet using  wheeled walker with Minimal assist of 1    ROM Within functional limits    Increase range of motion 10% of affected joints    Strength BUE:  refer to OT eval  RLE:  3+/5  LLE:   2+/5  Increase strength in affected mm groups by 1/3 grade   Balance Sitting EOB:  fair   Dynamic Standing:  fair- with WW Sitting EOB: fair   Dynamic Standing: fair with wheeled walker flexed posture and Left knee flexed.    Sitting EOB:  fair+  Dynamic Standing: fair with WW     Patient is Alert & Oriented x person and follows one step directions very confused, talks softly   Sensation:  Patient  denies numbness/tingling   Edema:  yes left lower extremity more at hip with bruising and some seeping   Endurance: fair -    Vitals: room air   Blood Pressure at rest  Blood Pressure during session    Heart Rate at rest  Heart Rate during session    SPO2 at rest %  SPO2 during session %     Patient education  Patient educated on role of Physical Therapy, risks of immobility, safety and plan of care, energy conservation,  importance of mobility while in hospital , importance and purpose of adaptive device and adjusted to proper height for the patient., safety , weight bearing status , left knee extension in bed and during stance phase of gait, and  seated exercises      Patient response to education:   Pt verbalized understanding Pt demonstrated skill Pt requires further education in this area   Yes Partial Yes      Treatment:  Patient practiced and was instructed/facilitated in the following treatment: Patient Sat edge of bed 10 minutes with Supervision  to increase dynamic sitting balance and activity tolerance. Pt performed bed mobility, transfers, short ambulation along bed, seated exercises.      Therapeutic Exercises:  long arc quad  x 15 reps within precautions    At end of session, patient in bed with alarm and daughter present call light and phone within reach,  all lines and tubes intact, nursing notified.      Patient would benefit from continued skilled Physical Therapy to improve functional independence and quality of life.         Patient's/ family goals   rehab    Time in     1403  Time out   1441    Total Treatment Time  38 minutes        CPT codes:  Therapeutic activities (57279)   38 minutes  3 unit(s)    Bucky Hwang PT License Number [id number]

## 2024-06-27 NOTE — PLAN OF CARE
Problem: Discharge Planning  Goal: Discharge to home or other facility with appropriate resources  6/27/2024 1310 by Monica Ospina RN  Outcome: Adequate for Discharge  Flowsheets (Taken 6/27/2024 0800 by Bren Wong RN)  Discharge to home or other facility with appropriate resources:   Identify barriers to discharge with patient and caregiver   Arrange for needed discharge resources and transportation as appropriate   Identify discharge learning needs (meds, wound care, etc)     Problem: Pain  Goal: Verbalizes/displays adequate comfort level or baseline comfort level  6/27/2024 1310 by Monica Ospina RN  Outcome: Adequate for Discharge     Problem: Skin/Tissue Integrity  Goal: Absence of new skin breakdown  Description: 1.  Monitor for areas of redness and/or skin breakdown  2.  Assess vascular access sites hourly  3.  Every 4-6 hours minimum:  Change oxygen saturation probe site  4.  Every 4-6 hours:  If on nasal continuous positive airway pressure, respiratory therapy assess nares and determine need for appliance change or resting period.  6/27/2024 1310 by Monica Ospina RN  Outcome: Adequate for Discharge     Problem: Safety - Adult  Goal: Free from fall injury  6/27/2024 1310 by Monica Ospina RN  Outcome: Adequate for Discharge     Problem: ABCDS Injury Assessment  Goal: Absence of physical injury  6/27/2024 1310 by Monica Ospina RN  Outcome: Adequate for Discharge     Problem: Chronic Conditions and Co-morbidities  Goal: Patient's chronic conditions and co-morbidity symptoms are monitored and maintained or improved  6/27/2024 1310 by Monica Ospina RN  Outcome: Adequate for Discharge  Flowsheets (Taken 6/27/2024 0800 by Bren Wong, RN)  Care Plan - Patient's Chronic Conditions and Co-Morbidity Symptoms are Monitored and Maintained or Improved: Monitor and assess patient's chronic conditions and comorbid symptoms for stability, deterioration, or improvement     Problem:

## 2024-06-27 NOTE — CARE COORDINATION
6/27/2024 1100 CM Note:  Pt is being discharged to [facility] today.  NO PRECERT NEEDED, ABILIO is signed, HENS Completed.  [facility] scheduled to transport pt via stretcher at 1230, ambulance form placed with the soft chart.  Melisa liaison aware of  time and pt's son Luis Alberto.  Pt's daughter is at the bedside.  CM will follow. Electronically signed by Brooklynn Maloney RN on 6/27/2024 at 11:07 AM

## 2024-06-27 NOTE — PLAN OF CARE
Problem: Discharge Planning  Goal: Discharge to home or other facility with appropriate resources  Outcome: Progressing  Flowsheets (Taken 6/26/2024 1912)  Discharge to home or other facility with appropriate resources: Identify barriers to discharge with patient and caregiver     Problem: Pain  Goal: Verbalizes/displays adequate comfort level or baseline comfort level  Outcome: Progressing     Problem: Skin/Tissue Integrity  Goal: Absence of new skin breakdown  Description: 1.  Monitor for areas of redness and/or skin breakdown  2.  Assess vascular access sites hourly  3.  Every 4-6 hours minimum:  Change oxygen saturation probe site  4.  Every 4-6 hours:  If on nasal continuous positive airway pressure, respiratory therapy assess nares and determine need for appliance change or resting period.  Outcome: Progressing     Problem: Safety - Adult  Goal: Free from fall injury  Outcome: Progressing     Problem: ABCDS Injury Assessment  Goal: Absence of physical injury  Outcome: Progressing     Problem: Chronic Conditions and Co-morbidities  Goal: Patient's chronic conditions and co-morbidity symptoms are monitored and maintained or improved  Outcome: Progressing  Flowsheets (Taken 6/26/2024 1912)  Care Plan - Patient's Chronic Conditions and Co-Morbidity Symptoms are Monitored and Maintained or Improved: Monitor and assess patient's chronic conditions and comorbid symptoms for stability, deterioration, or improvement     Problem: Nutrition Deficit:  Goal: Optimize nutritional status  Outcome: Progressing

## 2024-06-27 NOTE — DISCHARGE INSTR - COC
Continuity of Care Form    Patient Name: Lonnie Lamb   :  1935  MRN:  [id number]    Admit date:  2024  Discharge date:  2024    Code Status Order: Full Code   Advance Directives:   Advance Care Flowsheet Documentation       Date/Time Healthcare Directive Type of Healthcare Directive Copy in Chart Healthcare Agent Appointed Healthcare Agent's Name Healthcare Agent's Phone Number    [phone] Yes, patient has an advance directive for healthcare treatment Durable power of  for health care;Living will Yes, copy in chart Healthcare power of   Volodymyr Lamb Jr. -- --            Admitting Physician:  Alok Minaya MD  PCP: Alok Minaya MD    Discharging Nurse: Bren Wong RN  Discharging Hospital Unit/Room#: 0621/0621-01  Discharging Unit Phone Number: [phone]    Emergency Contact:   Extended Emergency Contact Information  Primary Emergency Contact: KETTY LAMB  Mobile Phone: [phone]  Relation: Child  Preferred language: English   needed? No  Secondary Emergency Contact: LONNIE LAMB JR  Mobile Phone: [phone]  Relation: Child  Preferred language: English   needed? No    Past Surgical History:  Past Surgical History:   Procedure Laterality Date    CORONARY ARTERY BYPASS GRAFT  2011    HEMORRHOID SURGERY      HIP SURGERY Left 2024    LEFT HIP HEMIARTHROPLASTY performed by Lonnie Jennings DO at [facility] OR    PERICARDIUM SURGERY         Immunization History:     There is no immunization history on file for this patient.    Active Problems:  Patient Active Problem List   Diagnosis Code    Stroke-like symptoms R29.90    Expressive aphasia R47.01    Hip fracture, left, closed, initial encounter ([facility]) S72.002A    Closed left hip fracture, initial encounter ([facility]) S72.002A    Fall W19.XXXA       Isolation/Infection:   Isolation            No Isolation          Patient Infection Status       None to display            Nurse Assessment:  Last  Vital Signs: BP (!) 110/59   Pulse 59   Temp 98.6 °F (37 °C) (Oral)   Resp 16   Ht 1.803 m (5' 10.98\")   Wt 70.3 kg (155 lb)   SpO2 99%   BMI 21.63 kg/m²     Last documented pain score (0-10 scale): Pain Level: 4  Last Weight:   Wt Readings from Last 1 Encounters:   06/27/24 70.3 kg (155 lb)     Mental Status:  oriented    IV Access:  - None    Nursing Mobility/ADLs:  Walking   Dependent  Transfer  Dependent  Bathing  Dependent  Dressing  Dependent  Toileting  Dependent  Feeding  Independent  Med Admin  Assisted  Med Delivery   whole    Wound Care Documentation and Therapy:  Wound 06/24/24 Coccyx Posterior Coccyx-open with slough (Active)   Wound Image   06/26/24 1115   Wound Etiology Pressure Stage 2 06/26/24 1912   Dressing Status Clean;Dry;Intact 06/26/24 1912   Wound Cleansed Cleansed with saline 06/26/24 1912   Dressing/Treatment Foam 06/27/24 0800   Wound Length (cm) 3 cm 06/26/24 1115   Wound Width (cm) 1.5 cm 06/26/24 1115   Wound Depth (cm) 0.2 cm 06/26/24 1115   Wound Surface Area (cm^2) 4.5 cm^2 06/26/24 1115   Change in Wound Size % (l*w) -50 06/26/24 1115   Wound Volume (cm^3) 0.9 cm^3 06/26/24 1115   Wound Assessment Pink/red 06/27/24 0800   Drainage Amount Scant (moist but unmeasurable) 06/27/24 0800   Drainage Description Serosanguinous 06/27/24 0800   Odor None 06/27/24 0800   Keturah-wound Assessment Non-blanchable erythema 06/27/24 0800   Number of days: 3       Wound 06/26/24 Knee Left (Active)   Wound Image   06/26/24 1115   Wound Etiology Traumatic 06/27/24 0800   Wound Cleansed Cleansed with saline 06/27/24 0800   Dressing/Treatment Foam 06/27/24 0800   Wound Length (cm) 3 cm 06/26/24 1115   Wound Width (cm) 2 cm 06/26/24 1115   Wound Depth (cm) 0.5 cm 06/26/24 1115   Wound Surface Area (cm^2) 6 cm^2 06/26/24 1115   Wound Volume (cm^3) 3 cm^3 06/26/24 1115   Wound Assessment Pink/red 06/27/24 0800   Drainage Amount Small (< 25%) 06/27/24 0800   Drainage Description Serosanguinous 06/27/24

## 2024-06-28 NOTE — PROGRESS NOTES
Physician Progress Note      PATIENT:               MARIVEL LAMB  CSN #:                  [id number]  :                       1935  ADMIT DATE:       2024 3:52 PM  DISCH DATE:        2024 3:39 PM  RESPONDING  PROVIDER #:        Richard Nur          QUERY TEXT:    Dear ,    Pt admitted with left hip fracture after a fall. Pt noted to have osteopenia   per x-rays. If possible, please document in progress notes and discharge   summary if you are evaluating and/or treating any of the following:    The medical record reflects the following:  Risk Factors: osteopenia, hip fx  Clinical Indicators: per op report : \" X-rays of the left hip and pelvis   demonstrated a displaced left femoral neck fracture as well as   osteopenia.....There is also discussed the morbidity and mortality that are   associated with the diagnosis of osteoporotic hip fractures.\"  Treatment: Ortho consult, x-rays/CT scans, sx intervention, Vitamin D3    Thank You,  Amber Pickett RN, BSN, CDS  Clinical Documentation Improvement Specialist  [phone]  Options provided:  -- Pathological left hip fracture due to osteopenia  -- Pathological left hip fracture due to osteopenia following fall which would   not usually break a normal, healthy bone  -- Osteoporotic left hip Fracture  -- Osteoporotic left hip fracture following fall which would not usually break   a normal, healthy bone  -- Traumatic left hip fracture  -- Other - I will add my own diagnosis  -- Disagree - Not applicable / Not valid  -- Disagree - Clinically unable to determine / Unknown  -- Refer to Clinical Documentation Reviewer    PROVIDER RESPONSE TEXT:    This patient has a pathological left hip fracture due to osteopenia.    Query created by: Amber Pickett on 2024 1:30 PM      Electronically signed by:  Richard Nur 2024 11:31 AM

## 2024-07-01 NOTE — DISCHARGE SUMMARY
Internal Medicine  Discharge Summary    NAME: Lonnie Pena  :  1935  MRN:  [id number]  PCP:Alok Minaya MD    ADMITTED: 2024      DISCHARGED: 2027    ADMITTING PHYSICIAN:  Alok Minaya MD    CONSULTANT(S):   IP CONSULT TO INTERNAL MEDICINE  IP CONSULT TO ORTHOPEDIC SURGERY  IP CONSULT TO CARDIOLOGY  IP CONSULT TO NEPHROLOGY  IP CONSULT TO DIETITIAN     ADMITTING DIAGNOSIS:   Left hip fracture  Atrial fibrillation  Coronary artery disease  Bullous pemphigoid  Depression  Dementia  Hypertension  Hyperlipidemia  Benign prostatic hypertrophy  History of fall  Acute pain secondary to left hip fracture  Type 2 diabetes  History of DVT    DISCHARGE DIAGNOSES:   Same    HOSPITAL COURSE:   This is an 88 year old male patient that was admitted on  with a left hip fracture after suffering a fall at the nursing home facility.  Ortho was consulted.  He underwent left hip hemiarthroplasty on 2024.  On  I got a call from nursing staff that the patient had a low blood pressure of 94/43 and that he was feeling a little dizzy. I advised to check his hemoglobin which had dropped down quite a bit since surgery.  I was notified that the incision was soaked with blood.  Orthopedic was notified and advised to apply pressure bandage.   I ordered a hemoglobin to be done every 12 hours and also gave him a liter of fluid.  Over the course of the stay he has remained stable.  He does have advanced dementia but does not offer any complaints.  He was seen and examined and remains stable.  All consults have signed off and he was discharged to the nursing home in stable condition.    LABS::  Lab Results   Component Value Date    WBC 8.3 2024    HGB 9.1 (L) 2024    HCT 28.4 (L) 2024     2024     2024    K 4.1 2024     2024    CREATININE 1.2 2024    BUN 22 2024    CO2 24 2024    GLUCOSE 150 (H) 2024    ALT 7 2024     AST 20 06/23/2024    INR 1.4 06/19/2024       Lab Results   Component Value Date    TSH 1.92 06/24/2024       IMAGING:  XR HIP LEFT (2-3 VIEWS)   Final Result   1st postoperative study after placement of a left hip prosthesis.  The left   hip prosthesis is in proper position.         XR FEMUR LEFT (MIN 2 VIEWS)   Final Result   1. Impacted and foreshortened left femoral neck fracture.   2. Mildly comminuted parasymphyseal left pubic rami fractures.         XR HIP 2-3 VW W PELVIS LEFT   Final Result   Left femoral neck fracture.         CT HEAD WO CONTRAST   Final Result   1.  There is no acute intracranial abnormality.  Specifically, there is no   intracranial hemorrhage.   2. Atrophy and periventricular microvascular ischemic disease   .         CT CERVICAL SPINE WO CONTRAST   Final Result   1. There is no acute compression fracture or subluxation of the cervical   spine.   2. Multilevel degenerative disc and degenerative joint disease.         CT THORACIC SPINE WO CONTRAST   Final Result   1. Moderate probable chronic anterior wedge compression deformity of T9. No   retropulsion of fragments.   2. Moderate osteopenia.   3. Multilevel degenerative changes.         CT LUMBAR SPINE WO CONTRAST   Final Result   1. No acute fracture or malalignment of the lumbar spine.   2. Moderate to severe multilevel facet arthropathy throughout the lumbar   spine.   3. Probable chronic mild circumferential canal stenosis at L3-4 due to   bulging disc and facet arthropathy.   4. Fusiform infrarenal abdominal aortic aneurysm measuring up to 3.3 cm in   diameter.      RECOMMENDATIONS:   Follow-up imaging of the abdominal aortic aneurysm every 3 years recommended.               BRIEF PHYSICAL EXAMINATION AND LABORATORIES ON DAY OF DISCHARGE:  VITALS:  BP (!) 110/59   Pulse 59   Temp 98.6 °F (37 °C) (Oral)   Resp 16   Ht 1.803 m (5' 10.98\")   Wt 70.3 kg (155 lb)   SpO2 99%   BMI 21.63 kg/m²     Patient is awake alert oriented

## 2024-07-02 LAB — SURGICAL PATHOLOGY REPORT: NORMAL

## 2024-07-08 ENCOUNTER — OFFICE VISIT (OUTPATIENT)
Dept: ORTHOPEDIC SURGERY | Age: 89
End: 2024-07-08

## 2024-07-08 VITALS — HEIGHT: 71 IN | BODY MASS INDEX: 21.7 KG/M2 | WEIGHT: 155 LBS

## 2024-07-08 DIAGNOSIS — S72.002A CLOSED LEFT HIP FRACTURE, INITIAL ENCOUNTER (HCC): Primary | ICD-10-CM

## 2024-07-08 PROCEDURE — 99024 POSTOP FOLLOW-UP VISIT: CPT

## 2024-07-08 NOTE — PROGRESS NOTES
Subjective:     Lonnie Pena is now 2 weeks post left hip hemiarthroplasty, DOS: 6/20/24.   Pain is controlled with current analgesics.  Medication(s) being used: acetaminophen. The patient denies  fever, wound drainage, increasing redness, pus, increasing pain, increasing swelling. Post op problems reported:  none.  He is ambulating with walker. He is at a facility and is doing PT. His daughter stated he gets up a few times a day.        Objective:      General :    alert, appears stated age, and cooperative   Gait:  Normal.   Sutures:   Sutures out.   Incision:  healing well, no significant drainage, no dehiscence, no significant erythema   Tenderness:  none   Flexion ROM:  full range of motion   Extension ROM:  full range of motion   Abduction ROM:  full range of motion   DVT Evaluation:  No evidence of DVT seen on physical exam.     Imaging:  Left hip hemiarthroplasty with no signs of aseptic loosening  Radiographic findings reviewed with patient     Assessment:     Encounter Diagnosis   Name Primary?    Closed left hip fracture, initial encounter ([facility]) Yes       Plan:     Continues current post-op course, staples were removed at today's appt  Patient is to start taking enteric coated aspirin 81 mg, 1 tablet twice daily.   Patient is to continue wearing JESUS hose, on during the day and off at night.   Outpatient physical therapy is to begin immediately.    No bathing/swimming/hot tub for two weeks or until incision is completely closed.    We will see the patient back in 4 weeks for repeat xray and evaluation.  Please call office with any questions or concerns at [phone].

## 2024-07-15 NOTE — PROGRESS NOTES
Physician Progress Note      PATIENT:               MARIVEL LAMB  CSN #:                  [id number]  :                       1935  ADMIT DATE:       2024 3:52 PM  DISCH DATE:        2024 3:39 PM  RESPONDING  PROVIDER #:        Alok Lopez MD          QUERY TEXT:    Dear ,    Pt admitted with left hip fracture. Pt noted to have drop in H&H. If possible,   please document in the progress notes and discharge summary if you are   evaluating and/or treating any of the following:    The medical record reflects the following:  Risk Factors: sx repair of left hip fx  Clinical Indicators: per  pn: \"Got a call from nursing staff today patient   had a low blood pressure of 94/43 this morning was feeling little dizzy I   advised to check his hemoglobin which had dropped down quite a bit he is a   status post left hip arthroplasty according to the nursing staff the incision   was soaked with blood orthopedic has been  informed that advised the pressure   bandage I ordered a hemoglobin to be done every 12 hours and also gave him a   liter of fluid\"; H&H 6.9/20.3 on ; H&H pre op 12.9/38.7  Treatment: H&H every 12 hours, transfuse 1URBC    Thank You,  Amber Pickett RN, BSN, CDS  Clinical Documentation Improvement Specialist  [phone]  Options provided:  -- Acute blood loss anemia  -- Acute on chronic blood loss anemia  -- Postoperative acute blood loss anemia  -- Other - I will add my own diagnosis  -- Disagree - Not applicable / Not valid  -- Disagree - Clinically unable to determine / Unknown  -- Refer to Clinical Documentation Reviewer    PROVIDER RESPONSE TEXT:    This patient has acute blood loss anemia.    Query created by: Amber Pickett on 2024 8:28 PM      Electronically signed by:  Alok Lopez MD 7/15/2024 10:42 AM
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
## 2024-08-12 ENCOUNTER — OFFICE VISIT (OUTPATIENT)
Dept: ORTHOPEDIC SURGERY | Age: 89
End: 2024-08-12

## 2024-08-12 VITALS — HEIGHT: 71 IN | WEIGHT: 155 LBS | BODY MASS INDEX: 21.7 KG/M2 | TEMPERATURE: 98 F

## 2024-08-12 DIAGNOSIS — S72.002A CLOSED LEFT HIP FRACTURE, INITIAL ENCOUNTER (HCC): Primary | ICD-10-CM

## 2024-08-12 PROCEDURE — 99024 POSTOP FOLLOW-UP VISIT: CPT | Performed by: ORTHOPAEDIC SURGERY

## 2024-08-12 NOTE — PROGRESS NOTES
Lonnie Pena is now 6 weeks post left hip enedina-arthroplasty, DOS: 6/20/24.   Pain is controlled without any medications. The patient denies  fever, wound drainage, increasing redness, pus, increasing pain, increasing swelling. Post op problems reported:  none.  He is ambulating with walker.         Objective:         General :    alert, appears stated age, and cooperative   Gait:  Antalgic.   Sutures:   removed   Incision:  healing well, no significant drainage, no dehiscence, no significant erythema   Tenderness:  none   Flexion ROM:  full range of motion   Extension ROM:  full range of motion   Abduction ROM:  full range of motion   DVT Evaluation:  No evidence of DVT seen on physical exam.     Imaging    Left with no signs of aseptic loosening    Radiographic findings reviewed with patient     Assessment:        Encounter Diagnosis   Name Primary?    Closed left hip fracture, initial encounter ([facility]) Yes          Plan:     Continues current post-op course  Patient is to discontinue taking enteric coated aspirin 325mg.    Patient is to discontinue wearing JESUS hose.    Continue with outpatient physical therapy.  Follow up 2 months.

## 2024-10-14 ENCOUNTER — OFFICE VISIT (OUTPATIENT)
Dept: ORTHOPEDIC SURGERY | Age: 89
End: 2024-10-14
Payer: MEDICARE

## 2024-10-14 VITALS — HEIGHT: 70 IN | TEMPERATURE: 98.6 F | BODY MASS INDEX: 22.19 KG/M2 | WEIGHT: 155 LBS

## 2024-10-14 DIAGNOSIS — S72.002A CLOSED LEFT HIP FRACTURE, INITIAL ENCOUNTER (HCC): Primary | ICD-10-CM

## 2024-10-14 PROCEDURE — 1036F TOBACCO NON-USER: CPT | Performed by: ORTHOPAEDIC SURGERY

## 2024-10-14 PROCEDURE — 99213 OFFICE O/P EST LOW 20 MIN: CPT | Performed by: ORTHOPAEDIC SURGERY

## 2024-10-14 PROCEDURE — 1123F ACP DISCUSS/DSCN MKR DOCD: CPT | Performed by: ORTHOPAEDIC SURGERY

## 2024-10-14 PROCEDURE — G8427 DOCREV CUR MEDS BY ELIG CLIN: HCPCS | Performed by: ORTHOPAEDIC SURGERY

## 2024-10-14 PROCEDURE — G8484 FLU IMMUNIZE NO ADMIN: HCPCS | Performed by: ORTHOPAEDIC SURGERY

## 2024-10-14 PROCEDURE — G8420 CALC BMI NORM PARAMETERS: HCPCS | Performed by: ORTHOPAEDIC SURGERY

## 2024-10-14 NOTE — PROGRESS NOTES
Chief Complaint   Patient presents with    Follow-up     2 month left hip enedina. Patient states he is feeling pretty good overall. States he has no pain today. Patient ambulating with walker.        Lonnie Pena returns today for follow-up of his left hemiarthroplasty.  DOS: 6/20/24. He reports this is better than when I saw the patient last. He is ambulating with walker. He has no pain.    Past Medical History:   Diagnosis Date    A-fib (HCC)     Atrial fibrillation (HCC)     CAD (coronary artery disease)     CHF (congestive heart failure) (HCC)     Chronic kidney disease     Diabetes mellitus (HCC)     DVT (deep venous thrombosis) (HCC)     Hyperlipidemia     Hypertension     Inguinal hernia, bilateral     Thrombocytopenia (HCC)      Past Surgical History:   Procedure Laterality Date    CORONARY ARTERY BYPASS GRAFT  2011    HEMORRHOID SURGERY      HIP SURGERY Left 6/20/2024    LEFT HIP HEMIARTHROPLASTY performed by Lonnie Jennings DO at [facility] OR    PERICARDIUM SURGERY         Current Outpatient Medications:     clobetasol (TEMOVATE) 0.05 % cream, Apply topically 2 times daily., Disp: 80 g, Rfl: 5    metoprolol tartrate (LOPRESSOR) 25 MG tablet, Take 1 tablet by mouth daily, Disp: 60 tablet, Rfl: 0    escitalopram (LEXAPRO) 20 MG tablet, Take 1 tablet by mouth daily, Disp: , Rfl:     metFORMIN (GLUCOPHAGE) 500 MG tablet, Take 1 tablet by mouth daily (with breakfast), Disp: , Rfl:     Cholecalciferol (VITAMIN D3 PO), Take 125 mcg by mouth daily, Disp: , Rfl:     tamsulosin (FLOMAX) 0.4 MG capsule, Take 1 capsule by mouth daily, Disp: , Rfl:     rivaroxaban (XARELTO) 20 MG TABS tablet, Take 1 tablet by mouth every 24 hours, Disp: , Rfl:   Allergies   Allergen Reactions    Adhesive Tape     Augmentin [Amoxicillin-Pot Clavulanate] Rash     Social History     Socioeconomic History    Marital status:      Spouse name: Not on file    Number of children: Not on file    Years of education: Not on file    Highest

## (undated) PROCEDURE — 0SRS0JA REPLACEMENT OF LEFT HIP JOINT, FEMORAL SURFACE WITH SYNTHETIC SUBSTITUTE, UNCEMENTED, OPEN APPROACH: ICD-10-PCS

## (undated) DEVICE — 4-PORT MANIFOLD: Brand: NEPTUNE 2

## (undated) DEVICE — ORTHOMAX TOTAL HIP PACK: Brand: MEDLINE INDUSTRIES, INC.

## (undated) DEVICE — MARKER,SKIN,WI/RULER AND LABELS: Brand: MEDLINE

## (undated) DEVICE — WIPES SKIN CLOTH READYPREP 9 X 10.5 IN 2% CHLORHEX GLUCONATE CHG PREOP

## (undated) DEVICE — SUTURE STRATAFIX SYMMETRIC SZ 1 L18IN ABSRB VLT CT1 L36CM SXPP1A404

## (undated) DEVICE — DRESSING HYDROFIBER AQUACEL AG ADVANTAGE 3.5X12 IN

## (undated) DEVICE — DRESSING GZ W1XL8IN COT XRFRM N ADH OVERWRAP CURAD

## (undated) DEVICE — GLOVE ORTHO 8   MSG9480

## (undated) DEVICE — GLOVE SURG SZ 8 L12IN FNGR THK79MIL GRN LTX FREE

## (undated) DEVICE — SOLUTION IRRIG 1000ML 0.9% SOD CHL USP POUR PLAS BTL

## (undated) DEVICE — SUTURE SUTTAPE L40IN DIA1.3MM NONABSORBABLE WHT BLU L26.5MM AR7500

## (undated) DEVICE — BASIC DOUBLE BASIN 2-LF: Brand: MEDLINE INDUSTRIES, INC.

## (undated) DEVICE — 2108 SERIES SAGITTAL BLADE (24.8 X 0.88 X 90.5MM)